# Patient Record
Sex: MALE | Employment: OTHER | URBAN - METROPOLITAN AREA
[De-identification: names, ages, dates, MRNs, and addresses within clinical notes are randomized per-mention and may not be internally consistent; named-entity substitution may affect disease eponyms.]

---

## 2017-05-24 ENCOUNTER — APPOINTMENT (OUTPATIENT)
Dept: CT IMAGING | Age: 82
DRG: 390 | End: 2017-05-24
Attending: EMERGENCY MEDICINE
Payer: MEDICARE

## 2017-05-24 ENCOUNTER — APPOINTMENT (OUTPATIENT)
Dept: GENERAL RADIOLOGY | Age: 82
DRG: 390 | End: 2017-05-24
Attending: EMERGENCY MEDICINE
Payer: MEDICARE

## 2017-05-24 ENCOUNTER — HOSPITAL ENCOUNTER (INPATIENT)
Age: 82
LOS: 2 days | Discharge: HOME | DRG: 390 | End: 2017-05-26
Attending: EMERGENCY MEDICINE | Admitting: SURGERY
Payer: MEDICARE

## 2017-05-24 DIAGNOSIS — R00.1 BRADYCARDIA: ICD-10-CM

## 2017-05-24 DIAGNOSIS — E87.1 HYPONATREMIA: ICD-10-CM

## 2017-05-24 DIAGNOSIS — E86.0 DEHYDRATION: ICD-10-CM

## 2017-05-24 DIAGNOSIS — K56.600 PARTIAL SMALL BOWEL OBSTRUCTION (HCC): Primary | ICD-10-CM

## 2017-05-24 PROBLEM — I10 ESSENTIAL HYPERTENSION: Chronic | Status: ACTIVE | Noted: 2017-05-24

## 2017-05-24 LAB
ALBUMIN SERPL BCP-MCNC: 3.8 G/DL (ref 3.4–5)
ALBUMIN/GLOB SERPL: 0.9 {RATIO} (ref 0.8–1.7)
ALP SERPL-CCNC: 75 U/L (ref 45–117)
ALT SERPL-CCNC: 26 U/L (ref 16–61)
AMPHET UR QL SCN: NEGATIVE
ANION GAP BLD CALC-SCNC: 8 MMOL/L (ref 3–18)
APPEARANCE UR: CLEAR
APTT PPP: 28.1 SEC (ref 23–36.4)
AST SERPL W P-5'-P-CCNC: 19 U/L (ref 15–37)
ATRIAL RATE: 51 BPM
BARBITURATES UR QL SCN: NEGATIVE
BASOPHILS # BLD AUTO: 0 K/UL (ref 0–0.06)
BASOPHILS # BLD AUTO: 0 K/UL (ref 0–0.06)
BASOPHILS # BLD: 0 % (ref 0–2)
BASOPHILS # BLD: 0 % (ref 0–2)
BENZODIAZ UR QL: NEGATIVE
BILIRUB SERPL-MCNC: 0.5 MG/DL (ref 0.2–1)
BILIRUB UR QL: NEGATIVE
BUN SERPL-MCNC: 21 MG/DL (ref 7–18)
BUN/CREAT SERPL: 22 (ref 12–20)
CALCIUM SERPL-MCNC: 9.4 MG/DL (ref 8.5–10.1)
CALCULATED P AXIS, ECG09: 13 DEGREES
CALCULATED R AXIS, ECG10: 36 DEGREES
CALCULATED T AXIS, ECG11: 73 DEGREES
CANNABINOIDS UR QL SCN: NEGATIVE
CHLORIDE SERPL-SCNC: 99 MMOL/L (ref 100–108)
CK MB CFR SERPL CALC: 2 % (ref 0–4)
CK MB SERPL-MCNC: 1.9 NG/ML (ref 5–25)
CK SERPL-CCNC: 96 U/L (ref 39–308)
CO2 SERPL-SCNC: 28 MMOL/L (ref 21–32)
COCAINE UR QL SCN: NEGATIVE
COLOR UR: YELLOW
CREAT SERPL-MCNC: 0.97 MG/DL (ref 0.6–1.3)
DIAGNOSIS, 93000: NORMAL
DIFFERENTIAL METHOD BLD: ABNORMAL
DIFFERENTIAL METHOD BLD: ABNORMAL
EOSINOPHIL # BLD: 0.1 K/UL (ref 0–0.4)
EOSINOPHIL # BLD: 0.1 K/UL (ref 0–0.4)
EOSINOPHIL NFR BLD: 1 % (ref 0–5)
EOSINOPHIL NFR BLD: 1 % (ref 0–5)
ERYTHROCYTE [DISTWIDTH] IN BLOOD BY AUTOMATED COUNT: 13.9 % (ref 11.6–14.5)
ERYTHROCYTE [DISTWIDTH] IN BLOOD BY AUTOMATED COUNT: 13.9 % (ref 11.6–14.5)
GLOBULIN SER CALC-MCNC: 4.2 G/DL (ref 2–4)
GLUCOSE SERPL-MCNC: 165 MG/DL (ref 74–99)
GLUCOSE UR STRIP.AUTO-MCNC: NEGATIVE MG/DL
HCT VFR BLD AUTO: 42.2 % (ref 36–48)
HCT VFR BLD AUTO: 44 % (ref 36–48)
HDSCOM,HDSCOM: NORMAL
HGB BLD-MCNC: 14.7 G/DL (ref 13–16)
HGB BLD-MCNC: 15.3 G/DL (ref 13–16)
HGB UR QL STRIP: NEGATIVE
INR PPP: 1 (ref 0.8–1.2)
KETONES UR QL STRIP.AUTO: NEGATIVE MG/DL
LEUKOCYTE ESTERASE UR QL STRIP.AUTO: NEGATIVE
LIPASE SERPL-CCNC: 106 U/L (ref 73–393)
LYMPHOCYTES # BLD AUTO: 21 % (ref 21–52)
LYMPHOCYTES # BLD AUTO: 8 % (ref 21–52)
LYMPHOCYTES # BLD: 1.4 K/UL (ref 0.9–3.6)
LYMPHOCYTES # BLD: 3.1 K/UL (ref 0.9–3.6)
MAGNESIUM SERPL-MCNC: 2 MG/DL (ref 1.6–2.6)
MCH RBC QN AUTO: 30.9 PG (ref 24–34)
MCH RBC QN AUTO: 31 PG (ref 24–34)
MCHC RBC AUTO-ENTMCNC: 34.8 G/DL (ref 31–37)
MCHC RBC AUTO-ENTMCNC: 34.8 G/DL (ref 31–37)
MCV RBC AUTO: 88.8 FL (ref 74–97)
MCV RBC AUTO: 89.2 FL (ref 74–97)
METHADONE UR QL: NEGATIVE
MONOCYTES # BLD: 0.7 K/UL (ref 0.05–1.2)
MONOCYTES # BLD: 1.6 K/UL (ref 0.05–1.2)
MONOCYTES NFR BLD AUTO: 5 % (ref 3–10)
MONOCYTES NFR BLD AUTO: 9 % (ref 3–10)
NEUTS SEG # BLD: 10.5 K/UL (ref 1.8–8)
NEUTS SEG # BLD: 14.7 K/UL (ref 1.8–8)
NEUTS SEG NFR BLD AUTO: 73 % (ref 40–73)
NEUTS SEG NFR BLD AUTO: 82 % (ref 40–73)
NITRITE UR QL STRIP.AUTO: NEGATIVE
OPIATES UR QL: NEGATIVE
P-R INTERVAL, ECG05: 186 MS
PCP UR QL: NEGATIVE
PH UR STRIP: 6.5 [PH] (ref 5–8)
PLATELET # BLD AUTO: 184 K/UL (ref 135–420)
PLATELET # BLD AUTO: 216 K/UL (ref 135–420)
PMV BLD AUTO: 8.8 FL (ref 9.2–11.8)
PMV BLD AUTO: 9.2 FL (ref 9.2–11.8)
POTASSIUM SERPL-SCNC: 4 MMOL/L (ref 3.5–5.5)
PROT SERPL-MCNC: 8 G/DL (ref 6.4–8.2)
PROT UR STRIP-MCNC: NEGATIVE MG/DL
PROTHROMBIN TIME: 13.3 SEC (ref 11.5–15.2)
Q-T INTERVAL, ECG07: 476 MS
QRS DURATION, ECG06: 108 MS
QTC CALCULATION (BEZET), ECG08: 438 MS
RBC # BLD AUTO: 4.75 M/UL (ref 4.7–5.5)
RBC # BLD AUTO: 4.93 M/UL (ref 4.7–5.5)
SODIUM SERPL-SCNC: 135 MMOL/L (ref 136–145)
SP GR UR REFRACTOMETRY: >1.03 (ref 1–1.03)
TROPONIN I SERPL-MCNC: <0.02 NG/ML (ref 0–0.06)
UROBILINOGEN UR QL STRIP.AUTO: 1 EU/DL (ref 0.2–1)
VENTRICULAR RATE, ECG03: 51 BPM
WBC # BLD AUTO: 14.5 K/UL (ref 4.6–13.2)
WBC # BLD AUTO: 17.8 K/UL (ref 4.6–13.2)

## 2017-05-24 PROCEDURE — 96361 HYDRATE IV INFUSION ADD-ON: CPT

## 2017-05-24 PROCEDURE — 96374 THER/PROPH/DIAG INJ IV PUSH: CPT

## 2017-05-24 PROCEDURE — 99285 EMERGENCY DEPT VISIT HI MDM: CPT

## 2017-05-24 PROCEDURE — 74011250636 HC RX REV CODE- 250/636: Performed by: SURGERY

## 2017-05-24 PROCEDURE — 74011250636 HC RX REV CODE- 250/636: Performed by: EMERGENCY MEDICINE

## 2017-05-24 PROCEDURE — 74011636320 HC RX REV CODE- 636/320: Performed by: EMERGENCY MEDICINE

## 2017-05-24 PROCEDURE — 82550 ASSAY OF CK (CPK): CPT | Performed by: EMERGENCY MEDICINE

## 2017-05-24 PROCEDURE — 80053 COMPREHEN METABOLIC PANEL: CPT | Performed by: EMERGENCY MEDICINE

## 2017-05-24 PROCEDURE — 71010 XR CHEST PORT: CPT

## 2017-05-24 PROCEDURE — 83690 ASSAY OF LIPASE: CPT | Performed by: EMERGENCY MEDICINE

## 2017-05-24 PROCEDURE — 74011250636 HC RX REV CODE- 250/636: Performed by: FAMILY MEDICINE

## 2017-05-24 PROCEDURE — 81003 URINALYSIS AUTO W/O SCOPE: CPT | Performed by: EMERGENCY MEDICINE

## 2017-05-24 PROCEDURE — 85730 THROMBOPLASTIN TIME PARTIAL: CPT | Performed by: EMERGENCY MEDICINE

## 2017-05-24 PROCEDURE — 85025 COMPLETE CBC W/AUTO DIFF WBC: CPT | Performed by: EMERGENCY MEDICINE

## 2017-05-24 PROCEDURE — 85610 PROTHROMBIN TIME: CPT | Performed by: EMERGENCY MEDICINE

## 2017-05-24 PROCEDURE — 65270000029 HC RM PRIVATE

## 2017-05-24 PROCEDURE — 96375 TX/PRO/DX INJ NEW DRUG ADDON: CPT

## 2017-05-24 PROCEDURE — 97161 PT EVAL LOW COMPLEX 20 MIN: CPT

## 2017-05-24 PROCEDURE — 93005 ELECTROCARDIOGRAM TRACING: CPT

## 2017-05-24 PROCEDURE — 74177 CT ABD & PELVIS W/CONTRAST: CPT

## 2017-05-24 PROCEDURE — 36415 COLL VENOUS BLD VENIPUNCTURE: CPT | Performed by: FAMILY MEDICINE

## 2017-05-24 PROCEDURE — 80307 DRUG TEST PRSMV CHEM ANLYZR: CPT | Performed by: FAMILY MEDICINE

## 2017-05-24 PROCEDURE — 83735 ASSAY OF MAGNESIUM: CPT | Performed by: EMERGENCY MEDICINE

## 2017-05-24 RX ORDER — SODIUM CHLORIDE 9 MG/ML
130 INJECTION, SOLUTION INTRAVENOUS CONTINUOUS
Status: DISCONTINUED | OUTPATIENT
Start: 2017-05-24 | End: 2017-05-25

## 2017-05-24 RX ORDER — ONDANSETRON 2 MG/ML
4 INJECTION INTRAMUSCULAR; INTRAVENOUS
Status: COMPLETED | OUTPATIENT
Start: 2017-05-24 | End: 2017-05-24

## 2017-05-24 RX ORDER — CARVEDILOL 3.12 MG/1
3.12 TABLET ORAL 2 TIMES DAILY WITH MEALS
COMMUNITY

## 2017-05-24 RX ORDER — DEXTROSE, SODIUM CHLORIDE, AND POTASSIUM CHLORIDE 5; .45; .15 G/100ML; G/100ML; G/100ML
125 INJECTION INTRAVENOUS CONTINUOUS
Status: DISCONTINUED | OUTPATIENT
Start: 2017-05-24 | End: 2017-05-26

## 2017-05-24 RX ORDER — ONDANSETRON 2 MG/ML
INJECTION INTRAMUSCULAR; INTRAVENOUS
Status: DISPENSED
Start: 2017-05-24 | End: 2017-05-24

## 2017-05-24 RX ORDER — HYDROMORPHONE HYDROCHLORIDE 1 MG/ML
1 INJECTION, SOLUTION INTRAMUSCULAR; INTRAVENOUS; SUBCUTANEOUS
Status: DISCONTINUED | OUTPATIENT
Start: 2017-05-24 | End: 2017-05-26 | Stop reason: HOSPADM

## 2017-05-24 RX ORDER — LATANOPROST 50 UG/ML
1 SOLUTION/ DROPS OPHTHALMIC
Status: DISCONTINUED | OUTPATIENT
Start: 2017-05-24 | End: 2017-05-26 | Stop reason: HOSPADM

## 2017-05-24 RX ORDER — FUROSEMIDE 40 MG/1
40 TABLET ORAL DAILY
Status: DISCONTINUED | OUTPATIENT
Start: 2017-05-25 | End: 2017-05-25

## 2017-05-24 RX ORDER — SODIUM CHLORIDE 0.9 % (FLUSH) 0.9 %
5-10 SYRINGE (ML) INJECTION AS NEEDED
Status: DISCONTINUED | OUTPATIENT
Start: 2017-05-24 | End: 2017-05-26 | Stop reason: HOSPADM

## 2017-05-24 RX ORDER — LOSARTAN POTASSIUM 25 MG/1
25 TABLET ORAL DAILY
COMMUNITY

## 2017-05-24 RX ORDER — SODIUM CHLORIDE 0.9 % (FLUSH) 0.9 %
5-10 SYRINGE (ML) INJECTION EVERY 8 HOURS
Status: DISCONTINUED | OUTPATIENT
Start: 2017-05-24 | End: 2017-05-26 | Stop reason: HOSPADM

## 2017-05-24 RX ORDER — ONDANSETRON 2 MG/ML
4 INJECTION INTRAMUSCULAR; INTRAVENOUS
Status: DISCONTINUED | OUTPATIENT
Start: 2017-05-24 | End: 2017-05-26 | Stop reason: HOSPADM

## 2017-05-24 RX ORDER — KETOROLAC TROMETHAMINE 15 MG/ML
15 INJECTION, SOLUTION INTRAMUSCULAR; INTRAVENOUS
Status: DISPENSED | OUTPATIENT
Start: 2017-05-24 | End: 2017-05-25

## 2017-05-24 RX ORDER — ACETAMINOPHEN 10 MG/ML
INJECTION, SOLUTION INTRAVENOUS
Status: DISPENSED
Start: 2017-05-24 | End: 2017-05-24

## 2017-05-24 RX ORDER — ACETAMINOPHEN 10 MG/ML
1000 INJECTION, SOLUTION INTRAVENOUS EVERY 6 HOURS
Status: COMPLETED | OUTPATIENT
Start: 2017-05-24 | End: 2017-05-24

## 2017-05-24 RX ORDER — MORPHINE SULFATE 2 MG/ML
2 INJECTION, SOLUTION INTRAMUSCULAR; INTRAVENOUS ONCE
Status: COMPLETED | OUTPATIENT
Start: 2017-05-24 | End: 2017-05-24

## 2017-05-24 RX ADMIN — HYDROMORPHONE HYDROCHLORIDE 1 MG: 1 INJECTION, SOLUTION INTRAMUSCULAR; INTRAVENOUS; SUBCUTANEOUS at 13:45

## 2017-05-24 RX ADMIN — ONDANSETRON HYDROCHLORIDE 4 MG: 2 INJECTION INTRAMUSCULAR; INTRAVENOUS at 05:30

## 2017-05-24 RX ADMIN — ONDANSETRON 4 MG: 2 INJECTION INTRAMUSCULAR; INTRAVENOUS at 11:25

## 2017-05-24 RX ADMIN — DEXTROSE MONOHYDRATE, SODIUM CHLORIDE, AND POTASSIUM CHLORIDE 125 ML/HR: 50; 4.5; 1.49 INJECTION, SOLUTION INTRAVENOUS at 16:35

## 2017-05-24 RX ADMIN — IOPAMIDOL 100 ML: 612 INJECTION, SOLUTION INTRAVENOUS at 06:03

## 2017-05-24 RX ADMIN — KETOROLAC TROMETHAMINE 15 MG: 15 INJECTION, SOLUTION INTRAMUSCULAR; INTRAVENOUS at 18:56

## 2017-05-24 RX ADMIN — ACETAMINOPHEN 1000 MG: 10 INJECTION, SOLUTION INTRAVENOUS at 05:00

## 2017-05-24 RX ADMIN — Medication 2 MG: at 11:25

## 2017-05-24 RX ADMIN — SODIUM CHLORIDE 130 ML/HR: 900 INJECTION, SOLUTION INTRAVENOUS at 10:22

## 2017-05-24 RX ADMIN — SODIUM CHLORIDE 1000 ML: 900 INJECTION, SOLUTION INTRAVENOUS at 06:16

## 2017-05-24 NOTE — ED NOTES
Pt hourly rounding competed. Wife at bedside with him. Safety   Pt (x) resting on stretcher with side rails up and call bell in reach. () in chair    () in parents arms. Toileting   Pt offered ()Bedpan     (x)Assistance to Restroom     ()Urinal  Ongoing Updates  Updated on plan of care and status of test results.   Pain Management  Inquired as to comfort and offered comfort measures:    (x) warm blankets   (x) dimmed lights

## 2017-05-24 NOTE — ROUTINE PROCESS
Bedside and Verbal shift change report given to Davide Norwood RN (oncoming nurse) by Montserrat Patel RN (offgoing nurse). Report included the following information SBAR, Kardex, Intake/Output, MAR and Recent Results.

## 2017-05-24 NOTE — ED NOTES
Patient transported at this time via stretcher to medical floor patient is alert and oriented at time of transfer.  All personal belonging included glasses sent with pateint transported with isidro Forrester

## 2017-05-24 NOTE — Clinical Note
Status[de-identified] Inpatient [101] Type of Bed: Medical [8] Inpatient Hospitalization Certified Necessary for the Following Reasons: 3. Patient receiving treatment that can only be provided in an inpatient setting (further clarification in H&P documentation) Admitting Diagnosis: Partial small bowel obstruction ([facility] [address] [[id number]] Admitting Physician: Phill Lee Attending Physician: Phill Lee Estimated Length of Stay: 3-4 Midnights Discharge Plan[de-identified] Home with Office Follow-up

## 2017-05-24 NOTE — IP AVS SNAPSHOT
[address] [address] 
[phone] Patient: Elijah Shankar MRN: [id number] FFT:9/44/1577 You are allergic to the following Allergen Reactions Venom-Wasp Anaphylaxis Pcn (Penicillins) Rash Recent Documentation Height Weight BMI Smoking Status 1.778 m 94.7 kg 29.96 kg/m2 Former Smoker Unresulted Labs Order Current Status CARDIAC PANEL,(CK, CKMB & TROPONIN) Collected (05/24/17 0442) LIPASE Collected (05/24/17 0442) MAGNESIUM Collected (05/24/17 0442) METABOLIC PANEL, COMPREHENSIVE Collected (05/24/17 0442) PROTHROMBIN TIME + INR Collected (05/24/17 0442) PTT Collected (05/24/17 0442) Emergency Contacts Name Discharge Info Relation Home Work Mobile Nicolette Felix  Spouse [3] [phone] About your hospitalization You were admitted on:  May 24, 2017 You last received care in the:  [address] [place] [address] You were discharged on:  May 26, 2017 Unit phone number:  [phone] Why you were hospitalized Your primary diagnosis was:  Partial Small Bowel Obstruction (Hcc) Your diagnoses also included:  Dehydration, Bradycardia, Hyponatremia, Essential Hypertension, Infectious Colitis, Enteritis And Gastroenteritis Providers Seen During Your Hospitalizations Provider Role Specialty Primary office phone Rachell Atkinson MD Attending Provider Emergency Medicine [phone] Krupa Talamantes MD Attending Provider General Surgery [phone] Your Primary Care Physician (PCP) Primary Care Physician Office Phone Office Fax Bess Coronado [phone] Follow-up Information Follow up With Details Comments Contact Info Tan Estevez MD On 5/30/2017 Follow-up appointment @ 1:45 p.m. [address] 
[phone] Current Discharge Medication List  
  
CONTINUE these medications which have NOT CHANGED Dose & Instructions Dispensing Information Comments Morning Noon Evening Bedtime COREG 3.125 mg tablet Generic drug:  carvedilol Your last dose was: Your next dose is:    
   
   
 Dose:  3.125 mg Take 3.125 mg by mouth two (2) times daily (with meals). Refills:  0  
     
   
   
   
  
 latanoprost 0.005 % ophthalmic solution Commonly known as:  Tonye Rist Your last dose was: Your next dose is:    
   
   
 Dose:  1 Drop Administer 1 Drop to both eyes nightly. Refills:  0  
     
   
   
   
  
 losartan 25 mg tablet Commonly known as:  COZAAR Your last dose was: Your next dose is:    
   
   
 Dose:  25 mg Take 25 mg by mouth daily. Refills:  0  
     
   
   
   
  
 magnesium hydroxide 400 mg/5 mL suspension Commonly known as:  MILK OF MAGNESIA Your last dose was: Your next dose is:    
   
   
 Dose:  30 mL Take 30 mL by mouth daily. Quantity:  180 mL Refills:  0  
     
   
   
   
  
 potassium chloride 20 mEq/15 mL solution Commonly known as:  KAON 10% Your last dose was: Your next dose is:    
   
   
 Dose:  20 mEq Take 15 mL by mouth two (2) times daily (with meals). Quantity:  480 mL Refills:  0 Discharge Instructions Bowel Blockage (Intestinal Obstruction): Care Instructions Your Care Instructions A bowel blockage, also called an intestinal obstruction, can prevent gas, fluids, or solids from moving through the intestines normally. It can cause constipation and, rarely, diarrhea. You may have pain, nausea, vomiting, and cramping. Most of the time, complete blockages require a stay in the hospital and possibly surgery.  But if your bowel is only partly blocked, your doctor may tell you to wait until it clears on its own and you are able to pass gas and stool. If so, there are things you can do at home to help make you feel better. If you have had surgery for a bowel blockage, there are things you can do at home to make sure you heal well. You can also make some changes to keep your bowel from becoming blocked again. Follow-up care is a key part of your treatment and safety. Be sure to make and go to all appointments, and call your doctor if you are having problems. It's also a good idea to know your test results and keep a list of the medicines you take. How can you care for yourself at home? If your doctor has told you to wait at home for a blockage to clear on its own: · Follow your doctor's instructions. These may include eating a liquid diet to avoid complete blockage. · Take your medicines exactly as prescribed. Call your doctor if you think you are having a problem with your medicine. · Put a heating pad set on low on your belly to relieve mild cramps and pain. To prevent another blockage · Try to eat smaller amounts of food more often. For example, have 5 or 6 small meals throughout the day instead of 2 or 3 large meals. · Chew your food very well. Try to chew each bite about 20 times or until it is liquid. · Avoid high-fiber foods and raw fruits and vegetables with skins, husks, strings, or seeds. These can form a ball of undigested material that can cause a blockage if a part of your bowel is scarred or narrowed. · Check with your doctor before you eat whole-grain products or use a fiber supplement such as Citrucel or Metamucil. · To help you have regular bowel movements, eat at regular times, do not strain during a bowel movement, and drink at least 8 to 10 glasses of water each day. If you have kidney, heart, or liver disease and have to limit fluids, talk with your doctor or before you increase the amount of fluids you drink. · Drink high-calorie liquid formulas if your doctor says to.  Severe symptoms may make it hard for your body to take in vitamins and minerals. · Get regular exercise. It helps you digest your food better. Get at least 30 minutes of physical activity on most days of the week. Walking is a good choice. When should you call for help? Call 911 anytime you think you may need emergency care. For example, call if: 
· You passed out (lost consciousness). · You have severe pain or swelling in your belly. · You vomit blood or what looks like coffee grounds. · You pass maroon or very bloody stools. · You cannot pass any stools or gas. Call your doctor now or seek immediate medical care if: 
· You have a new or higher fever. · You cannot keep fluids or medicines down. · You have new pain that gets worse when you move or cough. · Your symptoms become much worse than usual. 
Watch closely for changes in your health, and be sure to contact your doctor if: 
· You do not get better as expected. · You have steady diarrhea for more than 2 weeks. · You've been losing weight. Where can you learn more? Go to http://tequila-wong.info/. Enter J836 in the search box to learn more about \"Bowel Blockage (Intestinal Obstruction): Care Instructions. \" Current as of: August 9, 2016 Content Version: 11.2 © 9961-0964 [employer]. Care instructions adapted under license by [employer] (which disclaims liability or warranty for this information). If you have questions about a medical condition or this instruction, always ask your healthcare professional. [place] [address] any warranty or liability for your use of this information. Discharge Orders None Introducing hospitals & HEALTH SERVICES! Lizbeth Cisneros introduces [employer] patient portal. Now you can access parts of your medical record, email your doctor's office, and request medication refills online. 1. In your internet browser, go to [email or web address] 2. Click on the First Time User? Click Here link in the Sign In box. You will see the New Member Sign Up page. 3. Enter your [employer] Access Code exactly as it appears below. You will not need to use this code after youve completed the sign-up process. If you do not sign up before the expiration date, you must request a new code. · [employer] Access Code: [email or web address] Expires: 8/22/2017 12:51 PM 
 
4. Enter the last four digits of your Social Security Number (xxxx) and Date of Birth (mm/dd/yyyy) as indicated and click Submit. You will be taken to the next sign-up page. 5. Create a [employer] ID. This will be your [employer] login ID and cannot be changed, so think of one that is secure and easy to remember. 6. Create a [employer] password. You can change your password at any time. 7. Enter your Password Reset Question and Answer. This can be used at a later time if you forget your password. 8. Enter your e-mail address. You will receive e-mail notification when new information is available in [address] 9. Click Sign Up. You can now view and download portions of your medical record. 10. Click the Download Summary menu link to download a portable copy of your medical information. If you have questions, please visit the Frequently Asked Questions section of the [employer] website. Remember, [employer] is NOT to be used for urgent needs. For medical emergencies, dial 911. Now available from your iPhone and Android! General Information Please provide this summary of care documentation to your next provider. Patient Signature:  ____________________________________________________________ Date:  ____________________________________________________________  
  
[facility] Provider Signature:  ____________________________________________________________ Date:  ____________________________________________________________

## 2017-05-24 NOTE — ED NOTES
Bedside shift change report given to ANGEL Wade RN (oncoming nurse) by Avelino Holts. Gilford Ort RN (offgoing nurse). Report included the following information SBAR, Kardex, ED Summary, Procedure Summary, MAR, Accordion, Recent Results, Med Rec Status and Cardiac Rhythm NSR.

## 2017-05-24 NOTE — PROGRESS NOTES
Problem: Mobility Impaired (Adult and Pediatric)  Goal: *Acute Goals and Plan of Care (Insert Text)  Physical Therapy Goals  Initiated 5/24/2017 and to be accomplished within 3-5 day(s)  1. Patient will move from supine <> sit with S in prep for out of bed activity and change of position. 2. Patient will perform sit<> stand with S with LRAD in prep for transfers/ambulation. 3. Patient will transfer from bed <> chair with S with LRAD for time up in chair for completion of ADL activity. 4. Patient will ambulate 150 feet with LRAD for increased functional mobility at discharge. 5. Patient will ascend/descend 3-5 stairs with handrail(s) with minimal assistance/contact guard assist for home re-entry as needed. Outcome: Progressing Towards Goal  PHYSICAL THERAPY EVALUATION     Patient: Amanuel Garg (49 y.o. male)  Date: 5/24/2017  Primary Diagnosis: Partial small bowel obstruction (HCC)  SBO (small bowel obstruction) (HCC)  Precautions:Fall      ASSESSMENT :  Based on the objective data described below, the patient is an 81 yo M who presents with decreased independence in functional mobility with regard to bed mobility, transfers, gait, balance and activity tolerance with admission for above diagnosis. Pt nurse Johnson present and administering IV pain medication upon PT entry. Pt with abdominal pain 6/10 pre and 5/10 post session. Demonstrates bed mobility with SBA/Supervision and transfers with CGA. Standing balance fair with RW static/dynamic. Pt able pt participate in gt with RW/minimal assist, demonstrating slow marco a with shuffling gt and forward trunk posture/knee flexed. Small base of support also noted. Pt returned to bed and left supine in bed with all needs in reach. O2 in place throughout session. Pt desat to 87/88% with gt; returns to 93% with rest seated. Nurse Johnson notified.    Recommend HHPT for follow up physical therapy upon discharge to reach maximal level of independence/safety with functional mobility. Pt Education: pt educated in safety/technique during functional mobility tasks      Patient will benefit from skilled intervention to address the above impairments. Patients rehabilitation potential is considered to be Good  Factors which may influence rehabilitation potential include:   [ ]         None noted  [ ]         Mental ability/status  [X]         Medical condition  [ ]         Home/family situation and support systems  [ ]         Safety awareness  [ ]         Pain tolerance/management  [ ]         Other:        PLAN :  Recommendations and Planned Interventions:  [X]           Bed Mobility Training             [ ]    Neuromuscular Re-Education  [X]           Transfer Training                   [ ]    Orthotic/Prosthetic Training  [X]           Gait Training                          [ ]    Modalities  [X]           Therapeutic Exercises          [ ]    Edema Management/Control  [X]           Therapeutic Activities            [X]    Patient and Family Training/Education  [ ]           Other (comment):     Frequency/Duration: Patient will be followed by physical therapy 1-2 times per day to address goals. Discharge Recommendations: Home Health  Further Equipment Recommendations for Discharge: N/A       SUBJECTIVE:   Patient stated I usually and get around.       OBJECTIVE DATA SUMMARY:       Past Medical History:   Diagnosis Date    Arthritis      Hypertension       20yrs     Past Surgical History:   Procedure Laterality Date    HX BACK SURGERY         laminectomy    HX CATARACT REMOVAL        HX ORTHOPAEDIC   17y/o     broken right arm     HX ORTHOPAEDIC   1995     broke right ankle screws    HX TONSILLECTOMY         Barriers to Learning/Limitations: None  Compensate with: N/A  Prior Level of Function/Home Situation: amb with SPC or RW with standing for long period per pt.   Home Situation  Home Environment: Private residence  # Steps to Enter: 4  Rails to Enter: Yes  Hand Rails : Bilateral  One/Two Story Residence: Two story  # of Interior Steps: 12 (bedroom downstairs)  Interior Rails: Both  Living Alone: No  Support Systems: Spouse/Significant Other/Partner  Patient Expects to be Discharged to[employer] residence  Current DME Used/Available at Home: Silverio Jennifer, straight, Walker, rolling, Safety frame [employer], Shower chair, Wheelchair, Brace/Splint  Tub or Shower Type: Shower  Critical Behavior:  Neurologic State: Alert; Appropriate for age  Orientation Level: Appropriate for age;Oriented X4  Cognition: Appropriate decision making; Appropriate for age attention/concentration; Follows commands  Safety/Judgement: Awareness of environment; Fall prevention  Psychosocial  Patient Behaviors: Calm; Cooperative  Skin Condition/Temp: Warm  Skin Integumentary  Skin Color: Pale  Skin Condition/Temp: Warm  Turgor: Epidermis thin w/ loss of subcut tissue  Hair Growth: Sparce  Strength:    Strength: Generally decreased, functional (LE's; UE's grossly WFL)  Tone & Sensation:   Tone: Normal  Sensation: Intact  Range Of Motion:  AROM: Generally decreased, functional (R shld decr'd w/long h/o of same; bilat HS tightness )  PROM: Generally decreased, functional (as above)  Functional Mobility:  Bed Mobility:  Rolling: Supervision  Supine to Sit: Stand-by asssistance  Sit to Supine: Stand-by asssistance  Scooting: Supervision  Transfers:  Sit to Stand: Contact guard assistance  Stand to Sit: Contact guard assistance  Balance:   Sitting: Intact  Standing: Impaired; With support  Standing - Static: Fair  Standing - Dynamic : Fair  Ambulation/Gait Training:  Distance (ft): 30 Feet (ft)  Assistive Device: Gait belt;Walker, rolling (O2 in place)  Ambulation - Level of Assistance: Minimal assistance  Gait Abnormalities: Decreased step clearance;Shuffling gait;Trunk sway increased  Base of Support: Narrowed  Speed/Tara: Slow  Step Length: Left shortened;Right shortened  Swing Pattern: Left asymmetrical;Right asymmetrical  Interventions: Safety awareness training;Verbal cues  Pain:  Pain Scale 1: Numeric (0 - 10)  Pain Intensity 1: 5  Pain Location 1: Abdomen  Pain Orientation 1: Anterior  Pain Description 1: Aching;Constant  Pain Intervention(s) 1: Medication (see MAR)  Activity Tolerance:   Fair   Please refer to the flowsheet for vital signs taken during this treatment. After treatment:   [ ]         Patient left in no apparent distress sitting up in chair  [X]         Patient left in no apparent distress in bed  [X]         Call bell left within reach  [ ]         Nursing notified  [ ]         Caregiver present  [ ]         Bed alarm activated      COMMUNICATION/EDUCATION:   [X]         Fall prevention education was provided and the patient/caregiver indicated understanding. [X]         Patient/family have participated as able in goal setting and plan of care. [X]         Patient/family agree to work toward stated goals and plan of care. [ ]         Patient understands intent and goals of therapy, but is neutral about his/her participation. [ ]         Patient is unable to participate in goal setting and plan of care. Eval Complexity: History: MEDIUM  Complexity : 1-2 comorbidities / personal factors will impact the outcome/ POC Exam:LOW Complexity : 1-2 Standardized tests and measures addressing body structure, function, activity limitation and / or participation in recreation  Presentation: LOW Complexity : Stable, uncomplicated  Clinical Decision Making:Low Complexity amb 30ft  Overall Complexity:LOW      G-Codes (GP)  Mobility  [id number] Current  CJ= 20-39%   Goal  CI= 1-19%  The severity rating is based on the functional mobility assessment.      Thank you for this referral.  Refugio Turpin, PT   Time Calculation: 20 mins

## 2017-05-24 NOTE — ED NOTES
Dr. Estrada Coats in to see patient. Asked him if he wanted to try ng placement he said \"no\". Rn waited for pain medications to work and attempted again for ng placement but unable to advance on right and left comes out of his mouth. Telephone call to [place] on update of patient.

## 2017-05-24 NOTE — ROUTINE PROCESS
TRANSFER - IN REPORT:    Verbal report received from ANGEL Montez RN (name) on Ivett Bender  being received from ED (unit) for routine progression of care      Report consisted of patients Situation, Background, Assessment and   Recommendations(SBAR). Information from the following report(s) SBAR, Kardex, Intake/Output, MAR and Recent Results was reviewed with the receiving nurse. Opportunity for questions and clarification was provided. Assessment completed upon patients arrival to unit and care assumed.

## 2017-05-24 NOTE — IP AVS SNAPSHOT
Current Discharge Medication List  
  
CONTINUE these medications which have NOT CHANGED Dose & Instructions Dispensing Information Comments Morning Noon Evening Bedtime COREG 3.125 mg tablet Generic drug:  carvedilol Your last dose was: Your next dose is:    
   
   
 Dose:  3.125 mg Take 3.125 mg by mouth two (2) times daily (with meals). Refills:  0  
     
   
   
   
  
 latanoprost 0.005 % ophthalmic solution Commonly known as:  Biggs Ridgel Your last dose was: Your next dose is:    
   
   
 Dose:  1 Drop Administer 1 Drop to both eyes nightly. Refills:  0  
     
   
   
   
  
 losartan 25 mg tablet Commonly known as:  COZAAR Your last dose was: Your next dose is:    
   
   
 Dose:  25 mg Take 25 mg by mouth daily. Refills:  0  
     
   
   
   
  
 magnesium hydroxide 400 mg/5 mL suspension Commonly known as:  MILK OF MAGNESIA Your last dose was: Your next dose is:    
   
   
 Dose:  30 mL Take 30 mL by mouth daily. Quantity:  180 mL Refills:  0  
     
   
   
   
  
 potassium chloride 20 mEq/15 mL solution Commonly known as:  KAON 10% Your last dose was: Your next dose is:    
   
   
 Dose:  20 mEq Take 15 mL by mouth two (2) times daily (with meals). Quantity:  480 mL Refills:  0

## 2017-05-24 NOTE — PROGRESS NOTES
It appears home medications have yet to be reviewed with this patient. Dr. Yariel Lewis ordered the following meds to be continued upon admission which need clarification. Left message for RN on 3S to call back regarding reviewing/clarifiying home meds with patient. 1.  Lasix 40mg daily (entered onto [facility] Med list back in 2014 for a qty of #3 and x 3 days only)   2. Zestoretic 20/12.5mg sig: take half tab daily.       [address] [place] [address] Pharmacist  [phone]

## 2017-05-24 NOTE — PROGRESS NOTES
Spoke with RHONA Johnson who states when she attempted to review with the pt he denied lasix and prinzide and reports coreg and another med that he doesn't know dose of.  RN agrees to call pt wife at home to obtain current med list and will contact prescriber to d/c and/or add meds as appropriate.     [facility] Pharmacist  [phone]

## 2017-05-24 NOTE — ED NOTES
Telephone call with Dr. Beth Houston unable x2 nurses to advance ng. Patient requested to stop for now. Dr. Beth Houston said he underdstands.

## 2017-05-24 NOTE — CONSULTS
Medicine Consult Note    Patient: Enid Acevedo   Age:  80 y.o. Consulting Physician: Mikaela Liang MD  Reason for Consult: Medical Management; patient admitted by General Surgery with SBO    HPI:   Enid Acevedo is a 80 y.o. male who is being admitted by General Surgery with an SBO. We have been consulted for assistance with medical management. He has a PMH of arthritis, hypertension, glaucoma, mild aortic stenosis--following with Dr. Margoth Cristina, and a remote history of TB-treated appropriately. His chart does show sleep apnea but he does not use O2 or a CPAP machine at night. He reports sudden onset of generalized abdominal pain starting at 7pm last night. He had last eaten at 5pm and reports that this was a \"significant amount\" of shrimp. He had also eaten peanuts around 3pm.  He has been mildly nauseated along with this pain. He has not vomited, however. He reports no recent medical changes and no recent illnesses. He describes himself as \"always gassy\" but has not passed flatus since before dinner yesterday. Last BM was 2 days ago. He has no other complaints.     Past Medical History:  Past Medical History:   Diagnosis Date    Arthritis     Hypertension     20yrs       Past Surgical History:  Past Surgical History:   Procedure Laterality Date    HX BACK SURGERY      laminectomy    HX CATARACT REMOVAL      HX ORTHOPAEDIC  15y/o    broken right arm     HX ORTHOPAEDIC  1995    broke right ankle screws    HX TONSILLECTOMY         Family History:  Family History   Problem Relation Age of Onset    Cancer Mother      Colon       Social History:  Social History     Social History    Marital status:      Spouse name: N/A    Number of children: N/A    Years of education: N/A     Social History Main Topics    Smoking status: Former Smoker    Smokeless tobacco: None    Alcohol use 1.0 oz/week     2 Cans of beer per week      Comment: daily    Drug use: No    Sexual activity: Not Asked Other Topics Concern    None     Social History Narrative       Home Medications:  Prior to Admission medications    Medication Sig Start Date End Date Taking? Authorizing Provider   magnesium hydroxide (MILK OF MAGNESIA) 400 mg/5 mL suspension Take 30 mL by mouth daily. 1/14/14   Adiel Zepeda MD   potassium chloride (KAON 10%) 10 % solution Take 15 mL by mouth two (2) times daily (with meals). 1/14/14   Adiel Zepeda MD   QUEtiapine (SEROQUEL) 25 mg tablet Take 1 Tab by mouth nightly. 1/14/14   Adiel Zepeda MD   furosemide (LASIX) 40 mg tablet Take 1 Tab by mouth daily. X 3 days 1/3/14   Krystyna Walters MD   oxyCODONE-acetaminophen (PERCOCET) 5-325 mg per tablet Take 1-2 Tabs by mouth every four (4) hours as needed for Pain. 12/31/13   Adiel Zeepda MD   latanoprost (XALATAN) 0.005 % ophthalmic solution Administer 1 Drop to both eyes nightly. Historical Provider   lisinopril-hydrochlorothiazide (PRINZIDE, ZESTORETIC) 20-12.5 mg per tablet Take 0.5 Tabs by mouth daily. Historical Provider       Allergies: Allergies   Allergen Reactions    Venom-Wasp Anaphylaxis    Pcn [Penicillins] Rash       Review of Systems  A comprehensive review of systems was negative except for that written in the History of Present Illness. Physical Exam:     Visit Vitals    /76    Pulse 72    Temp 97.6 °F (36.4 °C)    Resp 18    Ht 5' 10\" (1.778 m)    Wt 90.7 kg (200 lb)    SpO2 91%    BMI 28.7 kg/m2       Physical Exam:  General appearance: alert, cooperative, no distress, appears stated age, non-toxic  Head: Normocephalic, without obvious abnormality, atraumatic, dry MM  Neck: supple, trachea midline  Lungs: clear to auscultation bilaterally  Heart: bradycardic regular rhythm, S1, S2 normal, soft grade 1 systolic murmur RUSB, no click, rub or gallop  Abdomen: soft, generalized mild tenderness. Bowel sounds hypoactive.  No masses,  no organomegaly  Extremities: extremities normal, atraumatic, no cyanosis or edema  Skin: Skin color, texture, turgor normal. No rashes or lesions  Neurologic: Grossly normal    Intake and Output:  Current Shift:     Last three shifts:       Lab/Data Reviewed: All lab results for the last 24 hours reviewed. Medications Reviewed. Assessment/Plan       Partial small bowel obstruction ([address] (5/24/2017)  - primary management per surgical team  - NPO, NG to LIWS  - NS @ 130cc/hr following 1L NS for gentle rehydration  - coags normal      Dehydration (5/24/2017)  - fluids as above  - not taking home lasix--PTA med list is incorrect  - I would not start any diuretics at this time      Bradycardia (5/24/2017)  - mild, likely his normal HR given home treatment w/ coreg  - not immediately concerning, monitor      Hyponatremia (5/24/2017)  - hypochloremic + BUN/Cr ratio increased suggests due to dehydration  - follow daily      Essential hypertension (5/24/2017)  - hold home antihypertensives for now  - would restart losartan before coreg given bradycardia    Dispo: per surgery    Thank you for allowing us to participate in the care of your patient. We will be following along with you.     Mare Ceballos Null, DO

## 2017-05-24 NOTE — ED PROVIDER NOTES
HPI Comments: 4:35 AM  Erick Gupta is a 80 y.o. male presenting to the ED C/O gradually worsening lower abdominal pain (8/10) onset 9 hours ago. Pt notes that he  ate a significant amount of shrimp last night. Pt notes that he also experienced  mild nausea upon onset of pain. Pt also complains of acute exacerbation of his back pain. Last BM was yesterday which he reports as normal. Dr. Grzegorz Guardado is his cardiologist. Pt does not use O2 at home. Pt is allergic to PCN and bees. PSHx spinal fusion and orthopedic surgery for ankle. Pt denies diarrhea, vomiting and any other Sx or complaints. Patient is a 80 y.o. male presenting with abdominal pain. The history is provided by the patient. No  was used. Abdominal Pain    This is a new problem. The current episode started 6 to 12 hours ago (9 hours ago). The problem occurs constantly. The problem has not changed since onset. The pain is located in the LLQ and RLQ. The pain is at a severity of 8/10. Associated symptoms include nausea and back pain. Pertinent negatives include no diarrhea and no vomiting. Written by TARAH Reeves, as dictated by Nixon. Jerri Gambino MD    Past Medical History:   Diagnosis Date    Arthritis     Hypertension     20yrs       Past Surgical History:   Procedure Laterality Date    HX BACK SURGERY      laminectomy    HX CATARACT REMOVAL      HX ORTHOPAEDIC  17y/o    broken right arm     HX ORTHOPAEDIC  1995    broke right ankle screws    HX TONSILLECTOMY           Family History:   Problem Relation Age of Onset    Cancer Mother      Colon       Social History     Social History    Marital status:      Spouse name: N/A    Number of children: N/A    Years of education: N/A     Occupational History    Not on file.      Social History Main Topics    Smoking status: Former Smoker    Smokeless tobacco: Not on file    Alcohol use 1.0 oz/week     2 Cans of beer per week      Comment: daily    Drug use: No    Sexual activity: Not on file     Other Topics Concern    Not on file     Social History Narrative         ALLERGIES: Venom-wasp and Pcn [penicillins]    Review of Systems   Gastrointestinal: Positive for abdominal pain and nausea. Negative for diarrhea and vomiting. Musculoskeletal: Positive for back pain. All other systems reviewed and are negative. Vitals:    05/24/17 1851 05/25/17 0001 05/25/17 0411 05/25/17 0706   BP: 121/75 114/63 121/70 117/61   Pulse: 99 72 77 72   Resp: 16 16 16 16   Temp: 98 °F (36.7 °C) 97.7 °F (36.5 °C) 97.9 °F (36.6 °C) 98.3 °F (36.8 °C)   SpO2: 94% 93% 93% 94%   Weight:  94.7 kg (208 lb 12.4 oz)     Height:                Physical Exam   Constitutional: He is oriented to person, place, and time. He appears well-developed and well-nourished. No distress. Uncomfortable appearing elderly male. Appears diaphoretic, toxic appearing   HENT:   Head: Normocephalic and atraumatic. Right Ear: External ear normal.   Left Ear: External ear normal.   Mouth/Throat: Oropharynx is clear and moist. Mucous membranes are dry. No oropharyngeal exudate. Eyes: Conjunctivae and EOM are normal. Pupils are equal, round, and reactive to light. No scleral icterus. mild pallor   Neck: Normal range of motion. Neck supple. No JVD present. No tracheal deviation present. No thyromegaly present. Cardiovascular: Regular rhythm and normal heart sounds. Bradycardia present. Pulmonary/Chest: Effort normal and breath sounds normal. No stridor. No respiratory distress. Lungs clear   Abdominal: Soft. Bowel sounds are normal. He exhibits no distension. There is tenderness in the left lower quadrant. There is no rebound and no guarding. Musculoskeletal: Normal range of motion. He exhibits no edema or tenderness. No soft tissue injuries   Lymphadenopathy:     He has no cervical adenopathy. Neurological: He is alert and oriented to person, place, and time. He has normal reflexes. No cranial nerve deficit. Coordination normal.   Skin: No rash noted. He is diaphoretic. No erythema. cool and diaphoretic   Psychiatric: He has a normal mood and affect. His behavior is normal. Judgment and thought content normal.   Nursing note and vitals reviewed. RESULTS:      PULSE OXIMETRY NOTE:  Pulse-ox is 90% on RA    EKG interpretation: (Preliminary)  Sinus bradycardia, U waves V1V2V3, 51 bpm,   EKG read by [employer]. Talon Rodriguez MD  at 4:38 AM     X-RAY FINDINGS:  6:55 AM  Chest x-ray shows no infiltrate no effusion and borderline cardiomegaly  Pending review by Radiologist  Recorded by Emanuel Dumont ED Scribe, as dictated by [employer]. Talon Rodriguez MD       CT ABD PELV W CONT   Final Result   IMPRESSION:     Dilated proximal to mid small bowel loops with transition to decompressed distal  small bowel loops in the right lower abdomen consistent with early or partial  small bowel obstruction.     Small amount of free fluid. .  As read by the radiologist.    XR CHEST PORT    (Results Pending)        Labs Reviewed   METABOLIC PANEL, COMPREHENSIVE - Abnormal; Notable for the following:        Result Value    Sodium 135 (*)     Chloride 99 (*)     Glucose 165 (*)     BUN 21 (*)     BUN/Creatinine ratio 22 (*)     Globulin 4.2 (*)     All other components within normal limits   URINALYSIS W/ RFLX MICROSCOPIC - Abnormal; Notable for the following:     Specific gravity >1.030 (*)     All other components within normal limits   CBC WITH AUTOMATED DIFF - Abnormal; Notable for the following:     WBC 17.8 (*)     MPV 8.8 (*)     NEUTROPHILS 82 (*)     LYMPHOCYTES 8 (*)     ABS. NEUTROPHILS 14.7 (*)     ABS. MONOCYTES 1.6 (*)     All other components within normal limits   CBC WITH AUTOMATED DIFF - Abnormal; Notable for the following:     WBC 14.5 (*)     ABS.  NEUTROPHILS 10.5 (*)     All other components within normal limits   CBC WITH AUTOMATED DIFF - Abnormal; Notable for the following:     RBC 4.39 (*)     MPV 9.1 (*)     MONOCYTES 11 (*)     All other components within normal limits   METABOLIC PANEL, COMPREHENSIVE - Abnormal; Notable for the following:     Glucose 100 (*)     Calcium 8.3 (*)     Albumin 2.9 (*)     All other components within normal limits   PROTHROMBIN TIME + INR   PTT   CARDIAC PANEL,(CK, CKMB & TROPONIN)   LIPASE   MAGNESIUM   DRUG SCREEN, URINE   PROTHROMBIN TIME + INR   PTT   LIPASE   CARDIAC PANEL,(CK, CKMB & TROPONIN)   MAGNESIUM   METABOLIC PANEL, COMPREHENSIVE   METABOLIC PANEL, BASIC   CBC WITH AUTOMATED DIFF       Recent Results (from the past 12 hour(s))   CBC WITH AUTOMATED DIFF    Collection Time: 05/25/17  5:12 AM   Result Value Ref Range    WBC 8.7 4.6 - 13.2 K/uL    RBC 4.39 (L) 4.70 - 5.50 M/uL    HGB 13.4 13.0 - 16.0 g/dL    HCT 40.1 36.0 - 48.0 %    MCV 91.3 74.0 - 97.0 FL    MCH 30.5 24.0 - 34.0 PG    MCHC 33.4 31.0 - 37.0 g/dL    RDW 14.4 11.6 - 14.5 %    PLATELET 218 208 - 695 K/uL    MPV 9.1 (L) 9.2 - 11.8 FL    NEUTROPHILS 63 40 - 73 %    LYMPHOCYTES 22 21 - 52 %    MONOCYTES 11 (H) 3 - 10 %    EOSINOPHILS 4 0 - 5 %    BASOPHILS 0 0 - 2 %    ABS. NEUTROPHILS 5.4 1.8 - 8.0 K/UL    ABS. LYMPHOCYTES 1.9 0.9 - 3.6 K/UL    ABS. MONOCYTES 1.0 0.05 - 1.2 K/UL    ABS. EOSINOPHILS 0.4 0.0 - 0.4 K/UL    ABS. BASOPHILS 0.0 0.0 - 0.06 K/UL    DF AUTOMATED     METABOLIC PANEL, COMPREHENSIVE    Collection Time: 05/25/17  5:12 AM   Result Value Ref Range    Sodium 137 136 - 145 mmol/L    Potassium 4.1 3.5 - 5.5 mmol/L    Chloride 105 100 - 108 mmol/L    CO2 26 21 - 32 mmol/L    Anion gap 6 3.0 - 18 mmol/L    Glucose 100 (H) 74 - 99 mg/dL    BUN 15 7.0 - 18 MG/DL    Creatinine 0.92 0.6 - 1.3 MG/DL    BUN/Creatinine ratio 16 12 - 20      GFR est AA >60 >60 ml/min/1.73m2    GFR est non-AA >60 >60 ml/min/1.73m2    Calcium 8.3 (L) 8.5 - 10.1 MG/DL    Bilirubin, total 0.5 0.2 - 1.0 MG/DL    ALT (SGPT) 18 16 - 61 U/L    AST (SGOT) 20 15 - 37 U/L    Alk.  phosphatase 56 45 - 117 U/L    Protein, total 6.5 6.4 - 8.2 g/dL    Albumin 2.9 (L) 3.4 - 5.0 g/dL    Globulin 3.6 2.0 - 4.0 g/dL    A-G Ratio 0.8 0.8 - 1.7           MDM  Number of Diagnoses or Management Options  Diagnosis management comments: DDx: Wide range of differentials.  Cardiac anomaly, inferior wall MI, ACS, PNA, vascular event, ischemic or infected bowel, ileus or bladder infection       Amount and/or Complexity of Data Reviewed  Clinical lab tests: reviewed and ordered (Magnesium, Lipase, Cardiac panel, Prothrombin time +INR, Comprehensive metabolic panel, Urinalysis w/ RFLX mciroscopic)  Tests in the radiology section of CPT®: ordered and reviewed (CT abd pelv w cont, Chest x-ray)  Tests in the medicine section of CPT®: reviewed and ordered (EKG)  Independent visualization of images, tracings, or specimens: yes (Chest x-ray, EKG)      ED Course     Medications   furosemide (LASIX) tablet 40 mg (40 mg Oral Given 5/25/17 0822)   latanoprost (XALATAN) 0.005 % ophthalmic solution 1 Drop (0 Drops Both Eyes Held 5/24/17 2200)   sodium chloride (NS) flush 5-10 mL ( IntraVENous Canceled Entry 5/24/17 2200)   sodium chloride (NS) flush 5-10 mL (not administered)   ketorolac (TORADOL) injection 15 mg (15 mg IntraVENous Given 5/24/17 1856)   HYDROmorphone (PF) (DILAUDID) injection 1 mg (1 mg IntraVENous Given 5/24/17 1345)   0.9% sodium chloride infusion (0 mL/hr IntraVENous Stopped 5/24/17 1600)   ondansetron (ZOFRAN) injection 4 mg (4 mg IntraVENous Given 5/24/17 1125)   dextrose 5% - 0.45% NaCl with KCl 20 mEq/L infusion (125 mL/hr IntraVENous New Bag 5/25/17 0106)   iopamidol (ISOVUE 300) 61 % contrast injection 100 mL (100 mL IntraVENous Given 5/24/17 0603)   acetaminophen (OFIRMEV) infusion 1,000 mg (1,000 mg IntraVENous Given 5/24/17 0500)   sodium chloride 0.9 % bolus infusion 1,000 mL (0 mL IntraVENous IV Completed 5/24/17 1022)   ondansetron (ZOFRAN) injection 4 mg (4 mg IntraVENous Given 5/24/17 3696)   morphine injection 2 mg (2 mg IntraVENous Given 5/24/17 1125)   barium sulfate (EZ PAQUE) 60 % (w/v) contrast susp 1-355 mL (355 mL Oral Given 5/25/17 1213)   barium sulfate (EZ PAQUE) 60 % (w/v) contrast susp 1-355 mL (150 mL Oral Given 5/25/17 1214)      Procedures  PROGRESS NOTE:  4:35 AM  Initial assessment performed. Written by Fern Marie ED Scribe, as dictated by Sandy Barnes. Gabriele Streeter MD     CONSULT NOTE:   7:47 AM   Sandy Barnes. Gabriele Streeter MD  spoke with Neha Javed MD    Specialty: General Surgery  Discussed pt's hx, disposition, and available diagnostic and imaging results. Reviewed care plans. Consulting physician agrees with plans as outlined. Agrees to admit pt to medical.   Written by TARAH Salgadoibfarooq, as dictated by Sandy Barnes. Gabriele Streeter MD      ADMISSION NOTE:  7:47 AM   Patient is being admitted to the hospital by Neha Javed MD . The results of their tests and reasons for their admission have been discussed with them and/or available family. They convey agreement and understanding for the need to be admitted and for their admission diagnosis. Written by TARAH Salgado, as dictated by Sandy Barnes. Gabriele Streeter MD .     CONSULT NOTE:   8:17 AM   Sandy Barnes. Gabriele Streeter MD  spoke with Avril Schneider MD    Specialty: Hospitalist  Discussed pt's hx, disposition, and available diagnostic and imaging results. Reviewed care plans. Consulting physician agrees with plans as outlined. Agrees to consult for medical treatment of this patientl. Written by Cara Streeter MD     CONDITIONS ON ADMISSION:  7:47 AM   Deep Vein Thrombosis is not present at the time of admission. Thrombosis is not present at the time of admission. Urinary Tract Infection is not present at the time of admission. Pneumonia is not present at the time of admission. MRSA is not present at the time of admission. Wound infection is not present at the time of admission. Pressure Ulcer is not present at the time of admission.     Written TARAH Carballo, as dictated by Jayashree Lynch. Ramin Kline MD .    CLINICAL IMPRESSION:    1. Partial small bowel obstruction (HCC)    2. Dehydration    3. Bradycardia    4. Hyponatremia        PLAN: Admit to Medical  ATTESTATIONS:  This note is prepared by Oswaldo Germain, acting as Scribe for Jayashree Lynch. Ramin Kline MD .    Jayashree Lynch. Ramin Kline MD : The scribe's documentation has been prepared under my direction and personally reviewed by me in its entirety. I confirm that the note above accurately reflects all work, treatment, procedures, and medical decision making performed by me.

## 2017-05-24 NOTE — ED NOTES
Pt back in bed from CT. Monitors on. VSS on 2L O2 NC. Call bell within reach. Pt states that his nausea and abdominal upset feels \"alot better\".

## 2017-05-24 NOTE — ROUTINE PROCESS
TRANSFER - OUT REPORT:    Verbal report given to Barbie العراقي RN(name) on Bryson Button  being transferred to medical floor(unit) for routine progression of care       Report consisted of patients Situation, Background, Assessment and   Recommendations(SBAR). sats drop at times 94% at time of tranfer  Information from the following report(s) SBAR, Kardex, ED Summary and MAR was reviewed with the receiving nurse. Lines:   Peripheral IV 05/24/17 Left Antecubital (Active)   Site Assessment Clean, dry, & intact 5/24/2017  6:00 AM   Phlebitis Assessment 0 5/24/2017  6:00 AM   Infiltration Assessment 0 5/24/2017  6:00 AM   Dressing Status Clean, dry, & intact 5/24/2017  6:00 AM   Dressing Type Transparent 5/24/2017  6:00 AM   Hub Color/Line Status Pink;Patent 5/24/2017  6:00 AM        Opportunity for questions and clarification was provided.       Patient transported with:   O2 @ 4 liters  Registered Nurse

## 2017-05-24 NOTE — PROGRESS NOTES
1244- Pt arrived to floor. Physical assessment and morning meds passed at this time. Pt oriented to room. Call light and phone within reach. Room clutter free. Nurse will continue to monitor    1300- Attempted to place NG tube unsuccessful. Paged Dr. Johana Dyer to make aware. 26- Spoke with Dr. Johana Dyer. Nurses to try again. [address] with Garo Strange in pharmacy regarding pt's medicines not being accurate. This nurse called pt's wife at home to request updated at home medicines. Wife states that this was taken in the emergency room and was \"very disappointed because I already gave this information to someone downstairs and it didn't go to where it needed to be. \" Wife asked on update of pt condition and nurse made wife aware that NG tube insertion was not successful. Wife upset at this time. 5-  Spoke with Dr. Johana Dyer regarding pt's at home medications not being accurate. In his note he states to hold diuretics but did not discontinue in [facility]. Wife bedside. Nurse manager in room attempting to place NG tube. Unsuccessful. Made Dr. Johana Dyer aware. Per Dr. Johana Dyer he will get ahold of Dr. Keiry Mims to make aware of situation. 0- Paged Dr. Johana Dyer to clarify if needed to get ahold of Dr. Keiry Mims. This nurse to page     3681- Paged Dr. Keiry Mims, Doctor in the middle of a case and nurse in [address] is to leave message to call when finishing case. 0695- Dr. Keiry Mims returned call. NG tube to remain out at this time. Pt and wife made aware. 36- Family came to nurses station stating that pt was in pain. Nurse in to see pt and states that he isn't in pain and doesn't want medicine. Pt educated to call when he is having pain. Verbalized understanding.

## 2017-05-24 NOTE — ED NOTES
Attempted by Ava Ramirez and William Gleason to place Ng however unsuccessful and patient states to stop for now. Dr. Zoe dowell.

## 2017-05-25 ENCOUNTER — APPOINTMENT (OUTPATIENT)
Dept: GENERAL RADIOLOGY | Age: 82
DRG: 390 | End: 2017-05-25
Attending: SURGERY
Payer: MEDICARE

## 2017-05-25 LAB
ALBUMIN SERPL BCP-MCNC: 2.9 G/DL (ref 3.4–5)
ALBUMIN/GLOB SERPL: 0.8 {RATIO} (ref 0.8–1.7)
ALP SERPL-CCNC: 56 U/L (ref 45–117)
ALT SERPL-CCNC: 18 U/L (ref 16–61)
ANION GAP BLD CALC-SCNC: 6 MMOL/L (ref 3–18)
AST SERPL W P-5'-P-CCNC: 20 U/L (ref 15–37)
BASOPHILS # BLD AUTO: 0 K/UL (ref 0–0.06)
BASOPHILS # BLD: 0 % (ref 0–2)
BILIRUB SERPL-MCNC: 0.5 MG/DL (ref 0.2–1)
BUN SERPL-MCNC: 15 MG/DL (ref 7–18)
BUN/CREAT SERPL: 16 (ref 12–20)
CALCIUM SERPL-MCNC: 8.3 MG/DL (ref 8.5–10.1)
CHLORIDE SERPL-SCNC: 105 MMOL/L (ref 100–108)
CO2 SERPL-SCNC: 26 MMOL/L (ref 21–32)
CREAT SERPL-MCNC: 0.92 MG/DL (ref 0.6–1.3)
DIFFERENTIAL METHOD BLD: ABNORMAL
EOSINOPHIL # BLD: 0.4 K/UL (ref 0–0.4)
EOSINOPHIL NFR BLD: 4 % (ref 0–5)
ERYTHROCYTE [DISTWIDTH] IN BLOOD BY AUTOMATED COUNT: 14.4 % (ref 11.6–14.5)
GLOBULIN SER CALC-MCNC: 3.6 G/DL (ref 2–4)
GLUCOSE SERPL-MCNC: 100 MG/DL (ref 74–99)
HCT VFR BLD AUTO: 40.1 % (ref 36–48)
HGB BLD-MCNC: 13.4 G/DL (ref 13–16)
LYMPHOCYTES # BLD AUTO: 22 % (ref 21–52)
LYMPHOCYTES # BLD: 1.9 K/UL (ref 0.9–3.6)
MCH RBC QN AUTO: 30.5 PG (ref 24–34)
MCHC RBC AUTO-ENTMCNC: 33.4 G/DL (ref 31–37)
MCV RBC AUTO: 91.3 FL (ref 74–97)
MONOCYTES # BLD: 1 K/UL (ref 0.05–1.2)
MONOCYTES NFR BLD AUTO: 11 % (ref 3–10)
NEUTS SEG # BLD: 5.4 K/UL (ref 1.8–8)
NEUTS SEG NFR BLD AUTO: 63 % (ref 40–73)
PLATELET # BLD AUTO: 166 K/UL (ref 135–420)
PMV BLD AUTO: 9.1 FL (ref 9.2–11.8)
POTASSIUM SERPL-SCNC: 4.1 MMOL/L (ref 3.5–5.5)
PROT SERPL-MCNC: 6.5 G/DL (ref 6.4–8.2)
RBC # BLD AUTO: 4.39 M/UL (ref 4.7–5.5)
SODIUM SERPL-SCNC: 137 MMOL/L (ref 136–145)
WBC # BLD AUTO: 8.7 K/UL (ref 4.6–13.2)

## 2017-05-25 PROCEDURE — 74011250636 HC RX REV CODE- 250/636: Performed by: SURGERY

## 2017-05-25 PROCEDURE — 74011000255 HC RX REV CODE- 255: Performed by: SURGERY

## 2017-05-25 PROCEDURE — 65270000029 HC RM PRIVATE

## 2017-05-25 PROCEDURE — 85025 COMPLETE CBC W/AUTO DIFF WBC: CPT | Performed by: FAMILY MEDICINE

## 2017-05-25 PROCEDURE — 80053 COMPREHEN METABOLIC PANEL: CPT | Performed by: FAMILY MEDICINE

## 2017-05-25 PROCEDURE — 97166 OT EVAL MOD COMPLEX 45 MIN: CPT

## 2017-05-25 PROCEDURE — 97535 SELF CARE MNGMENT TRAINING: CPT

## 2017-05-25 PROCEDURE — 74011000250 HC RX REV CODE- 250: Performed by: SURGERY

## 2017-05-25 PROCEDURE — 36415 COLL VENOUS BLD VENIPUNCTURE: CPT | Performed by: FAMILY MEDICINE

## 2017-05-25 PROCEDURE — 97116 GAIT TRAINING THERAPY: CPT

## 2017-05-25 PROCEDURE — 74011250637 HC RX REV CODE- 250/637: Performed by: SURGERY

## 2017-05-25 PROCEDURE — 77010033678 HC OXYGEN DAILY

## 2017-05-25 PROCEDURE — 74011250637 HC RX REV CODE- 250/637: Performed by: HOSPITALIST

## 2017-05-25 PROCEDURE — 74250 X-RAY XM SM INT 1CNTRST STD: CPT

## 2017-05-25 RX ORDER — CARVEDILOL 3.12 MG/1
3.12 TABLET ORAL 2 TIMES DAILY WITH MEALS
Status: DISCONTINUED | OUTPATIENT
Start: 2017-05-25 | End: 2017-05-26 | Stop reason: HOSPADM

## 2017-05-25 RX ADMIN — CARVEDILOL 3.12 MG: 3.12 TABLET, FILM COATED ORAL at 17:58

## 2017-05-25 RX ADMIN — BARIUM SULFATE 150 ML: 0.6 SUSPENSION ORAL at 12:14

## 2017-05-25 RX ADMIN — FUROSEMIDE 40 MG: 40 TABLET ORAL at 08:22

## 2017-05-25 RX ADMIN — DEXTROSE MONOHYDRATE, SODIUM CHLORIDE, AND POTASSIUM CHLORIDE 125 ML/HR: 50; 4.5; 1.49 INJECTION, SOLUTION INTRAVENOUS at 01:06

## 2017-05-25 RX ADMIN — Medication 10 ML: at 14:00

## 2017-05-25 RX ADMIN — LATANOPROST 1 DROP: 50 SOLUTION OPHTHALMIC at 22:00

## 2017-05-25 RX ADMIN — BARIUM SULFATE 355 ML: 0.6 SUSPENSION ORAL at 12:13

## 2017-05-25 RX ADMIN — DEXTROSE MONOHYDRATE, SODIUM CHLORIDE, AND POTASSIUM CHLORIDE 125 ML/HR: 50; 4.5; 1.49 INJECTION, SOLUTION INTRAVENOUS at 22:36

## 2017-05-25 RX ADMIN — DEXTROSE MONOHYDRATE, SODIUM CHLORIDE, AND POTASSIUM CHLORIDE 125 ML/HR: 50; 4.5; 1.49 INJECTION, SOLUTION INTRAVENOUS at 14:56

## 2017-05-25 NOTE — PROGRESS NOTES
Shift Summary :  Slept most of shift with no signs of distress or complaints of nausea. Passing gas. No bowel movement.

## 2017-05-25 NOTE — PROGRESS NOTES
Problem: Self Care Deficits Care Plan (Adult)  Goal: *Acute Goals and Plan of Care (Insert Text)  Occupational Therapy Goals  Initiated 5/25/2017 within 3 day(s). 1. Patient will perform lower body dressing with supervision/set-up w/ A/E for sock donning while sitting on EOB  2. Patient will perform grooming with modified independence while standing at sink. 3. Patient will perform toilet transfers with modified independence. 4. Patient will perform all aspects of toileting with modified independence. 5. Patient will perform LE ADLs w/ good body mechanics as evidenced by patients face maintained normal color/palor   6. Patient will utilize energy conservation techniques during functional activities with verbal cue(s). Outcome: Progressing Towards Goal  OCCUPATIONAL THERAPY EVALUATION/DISCHARGE     Patient: Jessy Calderon (89 y.o. male)  Date: 5/25/2017  Primary Diagnosis: Partial small bowel obstruction (HCC)  SBO (small bowel obstruction) (HCC)        Precautions:  Fall      ASSESSMENT AND RECOMMENDATIONS:  Based on the objective data described below, the patient presents with ability to perform ADL tasks at baseline level. Pt able to complete ADLs and spouse verifies that patient is walking and looks as he does normally/at baseline. Pt with pelvic dipping on Left during mobility. Pt able to perform LE sock donning w/ great effort and face turning purple as well as patient utilizing forward flexion. Pt instructed on body mechanics and with back fusion as well as OA and HTN, pt would benefit from alternative strategy. Pt has sock aide, but reports he doesn't use it. Spouse present and educated on benefits of use d/t lack of hip ROM for alternative strategies. Pt with no further OT/ADL concerns at this time as goals met during tx. Skilled occupational therapy is not indicated at this time.      Education: Reviewed Back Precautions s/p fusion (strain above/below fusion), body mechanics, home safety, adaptive strategies and adaptive dressing techniques including clothing modifications with patient verbalizing understanding at this time. Discharge Recommendations: per PT  Further Equipment Recommendations for Discharge: N/A        SUBJECTIVE:   Patient stated I'm fine. I can do everything for myself. I don't think I need Occupational Therapy.       OBJECTIVE DATA SUMMARY:       Past Medical History:   Diagnosis Date    Arthritis      Hypertension       20yrs     Past Surgical History:   Procedure Laterality Date    HX BACK SURGERY         laminectomy    HX CATARACT REMOVAL        HX ORTHOPAEDIC   17y/o     broken right arm     HX ORTHOPAEDIC   1995     broke right ankle screws    HX TONSILLECTOMY         Barriers to Learning/Limitations: yes;  cognitive  Compensate with: visual, verbal, tactile, kinesthetic cues/model     G-Codes (GP)  Self Care   Current  CI= 1-19%   Goal  CI= 1-19%   D/C  CI= 1-19%  The severity rating is based on the professional judgement & direct observation of Level of Assistance required for Functional Mobility and ADLs. Eval Complexity: History: MEDIUM Complexity : Expanded review of history including physical, cognitive and psychosocial  history ; Examination: MEDIUM Complexity : 3-5 performance deficits relating to physical, cognitive , or psychosocial skils that result in activity limitations and / or participation restrictions; Decision Making:MEDIUM Complexity : Patient may present with comorbidities that affect occupational performnce.  Miniml to moderate modification of tasks or assistance (eg, physical or verbal ) with assesment(s) is necessary to enable patient to complete evaluation      Prior Level of Function/Home Situation:   Home Situation  Home Environment: Private residence  # Steps to Enter: 4  Rails to Enter: Yes  Hand Rails : Bilateral  One/Two Story Residence: Two story  # of Interior Steps: 12 (bedroom downstairs)  Interior Rails: Both  Living Alone: No  Support Systems: Spouse/Significant Other/Partner  Patient Expects to be Discharged to[de-identified] Private residence  Current DME Used/Available at Home: Cyrilla Crape, straight, Walker, rolling, Safety frame [employer], Shower chair, Wheelchair, Brace/Splint  Tub or Shower Type: Shower  [X]     Right hand dominant       [ ]     Left hand dominant  Cognitive/Behavioral Status:  Neurologic State: Alert  Orientation Level: Oriented X4  Cognition: Follows commands  Safety/Judgement: Awareness of environment  Coordination:  Coordination: Within functional limits  Fine Motor Skills-Upper: Left Intact; Right Intact    Gross Motor Skills-Upper: Left Intact; Right Intact  Balance:  Sitting: Intact  Standing: Intact; With support  Strength:  Strength: Generally decreased, functional  Tone & Sensation:  Tone: Normal  Sensation: Intact  Range of Motion:  AROM: Generally decreased, functional  PROM: Generally decreased, functional  Functional Mobility and Transfers for ADLs:  Bed Mobility:  Supine to Sit: Modified independent  Sit to Supine: Modified independent  Scooting: Modified independent  Transfers:  Sit to Stand: Supervision;Modified independent  Bed to Chair: Supervision;Modified independent              Toilet Transfer : Supervision;Modified independent              Bathroom Mobility: Modified independent  ADL Assessment:  Feeding: Independent     Oral Facial Hygiene/Grooming: Supervision;Modified Independent     Bathing: Setup;Modified independent     Upper Body Dressing: Setup     Lower Body Dressing: Contact guard assistance     Toileting: Supervision;Modified independent     ADL Intervention:  Grooming  Washing Hands: Modified independent     Lower Body Dressing Assistance  Underpants: Supervision/set-up  Pants With Elastic Waist: Supervision/set-up  Socks: Contact guard assistance  Position Performed: Seated edge of bed  Adaptive Equipment Used:  (pt owns sock aide, but doesn't use)     Toileting  Toileting Assistance: Supervision/set up;Modified independent  Bladder Hygiene: Modified independent  Clothing Management: Modified independent     Cognitive Retraining  Safety/Judgement: Awareness of environment     Pain:  Pre-treatment: 0/10  Post-treatment: 0/10  Activity Tolerance:   Pt able to stand 5 minute(s). Pt able to complete ADLs with intermittent rest breaks. Please refer to the flowsheet for vital signs taken during this treatment. After treatment:   [ ]  Patient left in no apparent distress sitting up in chair  [X]  Patient left in no apparent distress in bed  [X]  Call bell left within reach  [X]  Nursing notified  [X]  Caregiver present  [ ]  Bed alarm activated      COMMUNICATION/EDUCATION:   Communication/Collaboration:  [X]      Home safety education was provided and the patient/caregiver indicated understanding. [X]      Patient/family have participated as able and agree with findings and recommendations. [ ]      Patient is unable to participate in plan of care at this time.      Thank you for this referral.  Margareth Tejeda, OTR/L  Time Calculation: 28 mins

## 2017-05-25 NOTE — PROGRESS NOTES
Problem: Mobility Impaired (Adult and Pediatric)  Goal: *Acute Goals and Plan of Care (Insert Text)  Physical Therapy Goals  Initiated 5/24/2017 and to be accomplished within 3-5 day(s)  1. Patient will move from supine <> sit with S in prep for out of bed activity and change of position. 2. Patient will perform sit<> stand with S with LRAD in prep for transfers/ambulation. 3. Patient will transfer from bed <> chair with S with LRAD for time up in chair for completion of ADL activity. 4. Patient will ambulate 150 feet with LRAD for increased functional mobility at discharge. 5. Patient will ascend/descend 3-5 stairs with handrail(s) with minimal assistance/contact guard assist for home re-entry as needed. Attempted PT treatment. Patient is downstairs for X-ray/imaging. Will follow up later for PT treatment as patient's schedule permits.      Que Crawford PT

## 2017-05-25 NOTE — PROGRESS NOTES
Hospitalist Progress Note    Patient: Chaz Barajas MRN: [id number]  CSN: [id number]    YOB: 1934  Age: 80 y.o.   Sex: male    DOA: 5/24/2017 LOS:  LOS: 1 day          Chief Complaint:    Partial SBO      Assessment/Plan   Partial SBO-SB series correlates this  HTN  Aortic valve disease  SARAH  Prior back surgery    Clinically better, has some right sided abd pain, but no N/V  Had BM today  Feels hungry  No fevers, no hematochezia    Will review with surgery  Trial clears, and monitor, continue IVF  Hold his lasix  DVT proph  Monitor  disussed with pt and wofe    Patient Active Problem List   Diagnosis Code    Acquired spondylolisthesis M43.10    Unspecified pleural effusion J90    Chronic airway obstruction, not elsewhere classified J44.9    SBO ([facility] bowel obstruction) ([facility]) K56.69    Ileus ([facility]) K56.7    SARAH (obstructive sleep apnea) G47.33    Partial small bowel obstruction (HCC) K56.69    Dehydration E86.0    Bradycardia R00.1    Hyponatremia E87.1    Essential hypertension I10       Subjective:    rigth side of abd feels sore  No nausea  No cramps  No diarrhea  Feeling hungry    Review of systems:    Constitutional: denies fevers, chills, myalgias  Respiratory: denies SOB, cough  Cardiovascular: denies chest pain, palpitations  Gastrointestinal: denies nausea, vomiting, diarrhea      Vital signs/Intake and Output:  Visit Vitals    /56    Pulse 75    Temp 97.5 °F (36.4 °C)    Resp 16    Ht 5' 10\" (1.778 m)    Wt 94.7 kg (208 lb 12.4 oz)    SpO2 94%    BMI 29.96 kg/m2     Current Shift:     Last three shifts:  05/23 1901 - 05/25 0700  In: 1565.1 [I.V.:1565.1]  Out: 430 [Urine:430]    Exam:    General: Well developed, alert, NAD, OX3  Head/Neck: NCAT, supple, No masses, No lymphadenopathy  CVS:Regular rate and rhythm, no M/R/G, S1/S2 heard, no thrill  Lungs:Clear to auscultation bilaterally, no wheezes, rhonchi, or rales  Abdomen: Soft, Nontender, No distention, some normal and some hi pitched Bowel sounds, No hepatomegaly  Extremities: No C/C/E, pulses palpable 2+  Skin:normal texture and turgor, no rashes, no lesions  Neuro:grossly normal , follows commands  Psych:appropriate                Labs: Results:       Chemistry Recent Labs      05/25/17 0512 05/24/17 0442   GLU  100*  165*   NA  137  135*   K  4.1  4.0   CL  105  99*   CO2  26  28   BUN  15  21*   CREA  0.92  0.97   CA  8.3*  9.4   AGAP  6  8   BUCR  16  22*   AP  56  75   TP  6.5  8.0   ALB  2.9*  3.8   GLOB  3.6  4.2*   AGRAT  0.8  0.9      CBC w/Diff Recent Labs      05/25/17   0512 05/24/17   1334  05/24/17 0442   WBC  8.7  17.8*  14.5*   RBC  4.39*  4.75  4.93   HGB  13.4  14.7  15.3   HCT  40.1  42.2  44.0   PLT  166  184  216   GRANS  63  82*  73   LYMPH  22  8*  21   EOS  4  1  1      Cardiac Enzymes Recent Labs      05/24/17 0442   CPK  96   CKND1  2.0      Coagulation Recent Labs      05/24/17 0442   PTP  13.3   INR  1.0   APTT  28.1       Lipid Panel No results found for: CHOL, CHOLPOCT, CHOLX, CHLST, CHOLV, 495663, HDL, LDL, NLDLCT, DLDL, LDLC, DLDLP, 118037, VLDLC, VLDL, TGL, TGLX, TRIGL, [id number], TRIGP, TGLPOCT, [id number], CHHD, CHHDX   BNP No results for input(s): BNPP in the last 72 hours.    Liver Enzymes Recent Labs      05/25/17 0512   TP  6.5   ALB  2.9*   AP  56   SGOT  20      Thyroid Studies No results found for: T4, T3U, TSH, TSHEXT     Procedures/imaging: see electronic medical records for all procedures/Xrays and details which were not copied into this note but were reviewed prior to creation of Devon Lane MD

## 2017-05-25 NOTE — PROGRESS NOTES
Noted pt transfer to . Pt admitted due to SBO. Donita Collier Met with pt and his wife at bedside to discuss plan of care. Pt has a walker, cane and a wheel chair, however, he is not currently using any of these. Noted recommendation for home health. Pt/family have indicated they are fine and do not need anything. CM to follow and assist as needed. Discharge Reassessment Plan:  High Risk and MSSP/Good Help ACO patients    RRAT Score:  21 or greater    High Risk Care Transition Interventions:  1. Discharge transition plan:  Physician follow up  2. Involved patient/caregiver in assessment, planning, education and implement of intervention. 3. CM daily patient care huddles/interdisciplinary rounds were completed. 4. PCP/Specialist appointment to be scheduled within 48 hours unless otherwise specified. 5. Facilitated transportation and logistics for follow-up appointments. 6. Facilitated Medication reconciliation  Pharmacy. Date/Time  7. Formal handoff between hospital provider and post-acute provider to transition patient completed. 8. Handoff to [facility] Nurse Navigator or PCP practice completed. Discharge follow-up phone call within 2  4 days (NN, [facility], Nursing) CM follow up as assigned. Care Management Interventions  PCP Verified by CM: Yes  Mode of Transport at Discharge:  Other (see comment) (Family/wife)  Transition of Care Consult (CM Consult): Discharge Planning  Health Maintenance Reviewed: Yes  Physical Therapy Consult: Yes  Occupational Therapy Consult: Yes  Current Support Network: Lives with Spouse  Confirm Follow Up Transport: Family  Plan discussed with Pt/Family/Caregiver: Yes  Discharge Location  Discharge Placement: Home

## 2017-05-25 NOTE — PROGRESS NOTES
AFVSS  Feels better. No N/V, could not place NG. He has hx of difficult NG placement. Abd - soft, ND, benign    Will get a small bowel series to look for mass or mechanical obstruction since this is his second time of this happening.

## 2017-05-25 NOTE — PROGRESS NOTES
conducted an initial consultation and Spiritual Assessment for Enid Acevedo, who is a 80 y. o.,male. Patients Primary Language is: [place]. According to the patients EMR Pentec[employer]al Affiliation is: [employer]. The reason the Patient came to the hospital is:   Patient Active Problem List    Diagnosis Date Noted    Partial small bowel obstruction ([facility] [address] 05/24/2017    Dehydration 05/24/2017    Bradycardia 05/24/2017    Hyponatremia 05/24/2017    Essential hypertension 05/24/2017    SARAH (obstructive sleep apnea) 01/15/2014    Chronic airway obstruction, not elsewhere classified 01/05/2014    SBO (small bowel obstruction) ([facility] [address] 01/05/2014    Ileus ([facility] [address] 01/05/2014    Unspecified pleural effusion 01/04/2014    Acquired spondylolisthesis 12/29/2013        The  provided the following Interventions:  Initiated a relationship of care and support. Explored issues of eleanor, belief, spirituality and Hoahaoism/ritual needs while hospitalized. Listened empathically. Provided chaplaincy education. Provided information about Spiritual Care Services. Offered prayer and assurance of continued prayers on patients behalf. Chart reviewed. The following outcomes were achieved:  Patient shared limited information about both their medical narrative and spiritual journey/beliefs. Patient processed feeling about current hospitalization. Patient expressed gratitude for pastoral care visit. Assessment:  Patient does not have any Hoahaoism/cultural needs that will affect patients preferences in health care. There are no further spiritual or Hoahaoism issues which require intervention at this time. Plan:  Chaplains will continue to follow and will provide pastoral care on an as needed/requested basis.  recommends bedside caregivers page  on duty if patient shows signs of acute spiritual or emotional distress.       Sister Nehemiah Gar MA, [facility]  [phone]

## 2017-05-25 NOTE — ROUTINE PROCESS
Bedside and Verbal shift change report given to Carly Espinosa (oncoming nurse) by Lorenzo Tom RN   (offgoing nurse). Report included the following information SBAR, Kardex, Intake/Output and MAR.

## 2017-05-25 NOTE — PROGRESS NOTES
Problem: Mobility Impaired (Adult and Pediatric)  Goal: *Acute Goals and Plan of Care (Insert Text)  Physical Therapy Goals  Initiated 5/24/2017 and to be accomplished within 3-5 day(s)  1. Patient will move from supine <> sit with S in prep for out of bed activity and change of position. 2. Patient will perform sit<> stand with S with LRAD in prep for transfers/ambulation. 3. Patient will transfer from bed <> chair with S with LRAD for time up in chair for completion of ADL activity. 4. Patient will ambulate 150 feet with LRAD for increased functional mobility at discharge. 5. Patient will ascend/descend 3-5 stairs with handrail(s) with minimal assistance/contact guard assist for home re-entry as needed. Outcome: Progressing Towards Goal  PHYSICAL THERAPY TREATMENT     Patient: Aristeo Bower (21 y.o. male)  Date: 5/25/2017  Diagnosis: Partial small bowel obstruction (HCC)  SBO (small bowel obstruction) (HCC) Partial small bowel obstruction (HCC)       Precautions: Fall   Chart, physical therapy assessment, plan of care and goals were reviewed. ASSESSMENT:  The patient participated in gait training this session. Patient demonstrates antalgic gait pattern with his left hip dipping/buckling in stance. Patient reports that he has walked this way since his back surgery, which was about 3-4 years ago. Patient ambulated about 100 feet without an assistive device. O2 saturations dropped to about 86% while on room air. Nursing entered and placed patient back on 2L O2. Will continue PT to maximize the quality of the patient's gait and activity tolerance. Recommend home health at discharge. Progression toward goals:  [X]      Improving appropriately and progressing toward goals  [ ]      Improving slowly and progressing toward goals  [ ]      Not making progress toward goals and plan of care will be adjusted       PLAN:  Patient continues to benefit from skilled intervention to address the above impairments. Continue treatment per established plan of care. Discharge Recommendations:  Home Health  Further Equipment Recommendations for Discharge:  N/A       SUBJECTIVE:   Patient stated My left leg has been hurting since my back surgery.       OBJECTIVE DATA SUMMARY:   Critical Behavior:  Neurologic State: Alert  Orientation Level: Oriented X4  Cognition: Follows commands  Safety/Judgement: Awareness of environment  Functional Mobility Training:  Bed Mobility:  Supine to Sit: Modified independent  Sit to Supine: Modified independent  Scooting: Modified independent  Transfers:  Sit to Stand: Supervision  Stand to Sit: Supervision  Bed to Chair: Supervision;Modified independent  Balance:  Sitting: Intact  Standing: Impaired; Without support  Standing - Static: Fair;Good  Standing - Dynamic : Fair  Ambulation/Gait Training:  Distance (ft): 100 Feet (ft)  Assistive Device: Gait belt  Ambulation - Level of Assistance: Contact guard assistance  Gait Abnormalities: Antalgic;Decreased step clearance; Step to gait  Base of Support: Narrowed  Speed/Tara: Slow  Step Length: Right shortened;Left shortened  Swing Pattern: Left asymmetrical  Interventions: Safety awareness training;Verbal cues     Pain:  Pain Scale 1: Numeric (0 - 10)  Pain Intensity 1: 0  Activity Tolerance:   Fair  Please refer to the flowsheet for vital signs taken during this treatment.   After treatment:   [ ] Patient left in no apparent distress sitting up in chair  [X] Patient left in no apparent distress in bed  [X] Call bell left within reach  [X] Nursing notified  [ ] Caregiver present  [ ] Bed alarm activated      Milvia Ibarra   Time Calculation: 12 mins

## 2017-05-25 NOTE — ROUTINE PROCESS
Bedside and Verbal shift change report given to ROSS Baker RN  (oncoming nurse) by  AIDAN Anand RN  (offgoing nurse). Report included the following information SBAR, Kardex, Recent Results and Med Rec Status.

## 2017-05-25 NOTE — ROUTINE PROCESS
Bedside and Verbal shift change report given to Pamlea Anand RN (oncoming nurse) by Mariah Mitchell (offgoing nurse). Report included the following information SBAR, Kardex and MAR.

## 2017-05-26 VITALS
DIASTOLIC BLOOD PRESSURE: 57 MMHG | SYSTOLIC BLOOD PRESSURE: 123 MMHG | OXYGEN SATURATION: 95 % | TEMPERATURE: 98.3 F | HEART RATE: 73 BPM | BODY MASS INDEX: 29.89 KG/M2 | RESPIRATION RATE: 16 BRPM | HEIGHT: 70 IN | WEIGHT: 208.78 LBS

## 2017-05-26 PROBLEM — A09 INFECTIOUS COLITIS, ENTERITIS AND GASTROENTERITIS: Status: ACTIVE | Noted: 2017-05-26

## 2017-05-26 LAB
ALBUMIN SERPL BCP-MCNC: 2.9 G/DL (ref 3.4–5)
ALBUMIN/GLOB SERPL: 0.9 {RATIO} (ref 0.8–1.7)
ALP SERPL-CCNC: 51 U/L (ref 45–117)
ALT SERPL-CCNC: 17 U/L (ref 16–61)
ANION GAP BLD CALC-SCNC: 10 MMOL/L (ref 3–18)
AST SERPL W P-5'-P-CCNC: 16 U/L (ref 15–37)
BASOPHILS # BLD AUTO: 0 K/UL (ref 0–0.06)
BASOPHILS # BLD: 0 % (ref 0–2)
BILIRUB SERPL-MCNC: 0.6 MG/DL (ref 0.2–1)
BUN SERPL-MCNC: 10 MG/DL (ref 7–18)
BUN/CREAT SERPL: 13 (ref 12–20)
CALCIUM SERPL-MCNC: 8.3 MG/DL (ref 8.5–10.1)
CHLORIDE SERPL-SCNC: 106 MMOL/L (ref 100–108)
CO2 SERPL-SCNC: 23 MMOL/L (ref 21–32)
CREAT SERPL-MCNC: 0.79 MG/DL (ref 0.6–1.3)
DIFFERENTIAL METHOD BLD: ABNORMAL
EOSINOPHIL # BLD: 0.3 K/UL (ref 0–0.4)
EOSINOPHIL NFR BLD: 5 % (ref 0–5)
ERYTHROCYTE [DISTWIDTH] IN BLOOD BY AUTOMATED COUNT: 14.6 % (ref 11.6–14.5)
GLOBULIN SER CALC-MCNC: 3.4 G/DL (ref 2–4)
GLUCOSE SERPL-MCNC: 106 MG/DL (ref 74–99)
HCT VFR BLD AUTO: 38.6 % (ref 36–48)
HGB BLD-MCNC: 13.1 G/DL (ref 13–16)
LYMPHOCYTES # BLD AUTO: 23 % (ref 21–52)
LYMPHOCYTES # BLD: 1.6 K/UL (ref 0.9–3.6)
MCH RBC QN AUTO: 30.7 PG (ref 24–34)
MCHC RBC AUTO-ENTMCNC: 33.9 G/DL (ref 31–37)
MCV RBC AUTO: 90.4 FL (ref 74–97)
MONOCYTES # BLD: 1.4 K/UL (ref 0.05–1.2)
MONOCYTES NFR BLD AUTO: 20 % (ref 3–10)
NEUTS SEG # BLD: 3.7 K/UL (ref 1.8–8)
NEUTS SEG NFR BLD AUTO: 52 % (ref 40–73)
PLATELET # BLD AUTO: 174 K/UL (ref 135–420)
PMV BLD AUTO: 9 FL (ref 9.2–11.8)
POTASSIUM SERPL-SCNC: 3.9 MMOL/L (ref 3.5–5.5)
PROT SERPL-MCNC: 6.3 G/DL (ref 6.4–8.2)
RBC # BLD AUTO: 4.27 M/UL (ref 4.7–5.5)
SODIUM SERPL-SCNC: 139 MMOL/L (ref 136–145)
WBC # BLD AUTO: 7 K/UL (ref 4.6–13.2)

## 2017-05-26 PROCEDURE — 80053 COMPREHEN METABOLIC PANEL: CPT | Performed by: FAMILY MEDICINE

## 2017-05-26 PROCEDURE — 74011250636 HC RX REV CODE- 250/636: Performed by: SURGERY

## 2017-05-26 PROCEDURE — 85025 COMPLETE CBC W/AUTO DIFF WBC: CPT | Performed by: FAMILY MEDICINE

## 2017-05-26 PROCEDURE — 36415 COLL VENOUS BLD VENIPUNCTURE: CPT | Performed by: FAMILY MEDICINE

## 2017-05-26 PROCEDURE — 77010033678 HC OXYGEN DAILY

## 2017-05-26 RX ORDER — CALCIUM CARBONATE 600 MG
600 TABLET ORAL DAILY
COMMUNITY

## 2017-05-26 RX ORDER — MELATONIN
2000 DAILY
COMMUNITY

## 2017-05-26 RX ORDER — ASCORBIC ACID 500 MG
500 TABLET ORAL DAILY
COMMUNITY

## 2017-05-26 RX ORDER — CARBOXYMETHYLCELLULOSE SODIUM 10 MG/ML
1 GEL OPHTHALMIC AS NEEDED
COMMUNITY

## 2017-05-26 RX ADMIN — DEXTROSE MONOHYDRATE, SODIUM CHLORIDE, AND POTASSIUM CHLORIDE 125 ML/HR: 50; 4.5; 1.49 INJECTION, SOLUTION INTRAVENOUS at 06:55

## 2017-05-26 NOTE — PROGRESS NOTES
7894-Ambulating in room, shuffling gate noted. Asked patient to call for assistance when getting up out of bed for safety. Very pleasant. Denies need for pain medication at this time. Stable. Call light and personal items within reach. Assisted back to bed, resting quietly. Stable. 0422-No change from initial assessment. Bed alarm activated. 0630-Resting in bed, eyes closed, respirations 16, snoring. Shift Summary:  Bed alarm utilized during shift for safety; concerned about gait. Uneventful shift. Rested quietly. Ambulated to bathroom with assistance during shift.

## 2017-05-26 NOTE — DISCHARGE INSTRUCTIONS
Bowel Blockage (Intestinal Obstruction): Care Instructions  Your Care Instructions  A bowel blockage, also called an intestinal obstruction, can prevent gas, fluids, or solids from moving through the intestines normally. It can cause constipation and, rarely, diarrhea. You may have pain, nausea, vomiting, and cramping. Most of the time, complete blockages require a stay in the hospital and possibly surgery. But if your bowel is only partly blocked, your doctor may tell you to wait until it clears on its own and you are able to pass gas and stool. If so, there are things you can do at home to help make you feel better. If you have had surgery for a bowel blockage, there are things you can do at home to make sure you heal well. You can also make some changes to keep your bowel from becoming blocked again. Follow-up care is a key part of your treatment and safety. Be sure to make and go to all appointments, and call your doctor if you are having problems. It's also a good idea to know your test results and keep a list of the medicines you take. How can you care for yourself at home? If your doctor has told you to wait at home for a blockage to clear on its own:  · Follow your doctor's instructions. These may include eating a liquid diet to avoid complete blockage. · Take your medicines exactly as prescribed. Call your doctor if you think you are having a problem with your medicine. · Put a heating pad set on low on your belly to relieve mild cramps and pain. To prevent another blockage  · Try to eat smaller amounts of food more often. For example, have 5 or 6 small meals throughout the day instead of 2 or 3 large meals. · Chew your food very well. Try to chew each bite about 20 times or until it is liquid. · Avoid high-fiber foods and raw fruits and vegetables with skins, husks, strings, or seeds.  These can form a ball of undigested material that can cause a blockage if a part of your bowel is scarred or narrowed. · Check with your doctor before you eat whole-grain products or use a fiber supplement such as Citrucel or Metamucil. · To help you have regular bowel movements, eat at regular times, do not strain during a bowel movement, and drink at least 8 to 10 glasses of water each day. If you have kidney, heart, or liver disease and have to limit fluids, talk with your doctor or before you increase the amount of fluids you drink. · Drink high-calorie liquid formulas if your doctor says to. Severe symptoms may make it hard for your body to take in vitamins and minerals. · Get regular exercise. It helps you digest your food better. Get at least 30 minutes of physical activity on most days of the week. Walking is a good choice. When should you call for help? Call 911 anytime you think you may need emergency care. For example, call if:  · You passed out (lost consciousness). · You have severe pain or swelling in your belly. · You vomit blood or what looks like coffee grounds. · You pass maroon or very bloody stools. · You cannot pass any stools or gas. Call your doctor now or seek immediate medical care if:  · You have a new or higher fever. · You cannot keep fluids or medicines down. · You have new pain that gets worse when you move or cough. · Your symptoms become much worse than usual.  Watch closely for changes in your health, and be sure to contact your doctor if:  · You do not get better as expected. · You have steady diarrhea for more than 2 weeks. · You've been losing weight. Where can you learn more? Go to http://tequila-wong.info/. Enter K541 in the search box to learn more about \"Bowel Blockage (Intestinal Obstruction): Care Instructions. \"  Current as of: August 9, 2016  Content Version: 11.2  © 7023-1211 [employer]. Care instructions adapted under license by [employer] (which disclaims liability or warranty for this information).  If you have questions about a medical condition or this instruction, always ask your healthcare professional. [place] [address] any warranty or liability for your use of this information.

## 2017-05-26 NOTE — ROUTINE PROCESS
Bedside and Verbal shift change report given to Kandi Chatman RN (oncoming nurse) by Mery Jha RN (offgoing nurse). Report included the following information SBAR and Kardex.

## 2017-05-26 NOTE — ROUTINE PROCESS
Bedside and Verbal shift change report given to Chris Byrd RN (oncoming nurse) by Hayley Bryant RN (offgoing nurse). Report included the following information SBAR and Kardex.

## 2017-05-26 NOTE — ROUTINE PROCESS
Verbal shift change report given to Haydee Hauser RN (oncoming nurse) by Franci Ayala   (offgoing nurse). Report included the following information SBAR, Kardex and MAR.

## 2017-05-26 NOTE — PROGRESS NOTES
Interviewed pt on day of discharge to obtain gold standard PAML. Updated EHR accordingly. Notified pt nurse regarding need to update AV discharge summary. Discussed with pt the importance of and strategies they could employ to maintain a current and readily available home medication list .   Addressed pt concerns:   1. Pt using own Xalatan gtts (wife administering) wife concerned about being charged for gtts by hospital.  This pharmacist ensured the hospital charge was reversed. 2.  Concerned he was only receiving one of his two blood pressure pills. I assured pt that his BP was being monitored and assessed multiple times throughout the day. 3.  Concerns that existing home medication list is not correct. This pharmacist reviewed and pt confirmed all home meds in [facility]. Pt anxious to go home and did not want to wait for Dr. Yuniel Weber to be contacted to update the discharge AV summary.   Pt expressed understanding that the medication list on the current discharge summary is not correct and stated that they will bring all their medication bottles (rx and otc) to follow up appt w/ PCP to ensure they also have a current and accurate home medication list.      [facility] Pharmacist  [phone]

## 2017-05-26 NOTE — PROGRESS NOTES
Shift summary: Pt. Up to the bathroom as needed. Worked with PT today. Diet advanced to clear liquids, tolerated well. No complaints of pain or nausea. Wife visited throughout shift, placed her contact number on the board to be reach at any time concerning her . Bed alarm set at bedtime with call light in reach.      Patient Vitals for the past 12 hrs:   Temp Pulse Resp BP SpO2   05/26/17 0001 98 °F (36.7 °C) 83 16 103/58 92 %   05/1934 97.9 °F (36.6 °C) 75 16 116/58 96 %   05/25/17 1808 - - - - 91 %   05/25/17 1743 97.4 °F (36.3 °C) 76 16 130/67 91 %   05/25/17 1344 97.5 °F (36.4 °C) 75 16 121/56 94 %

## 2017-05-26 NOTE — ROUTINE PROCESS
Discharge instructions explained and understood by the patient. IV discontinued, no redness, swelling or pain noted. Pt discharged off the unit via wheelchair with [facility] transport accompanied by wife.

## 2017-06-02 NOTE — DISCHARGE SUMMARY
Discharge Summary    Patient: Macey Holt MRN: [id number]  CSN: [id number]    YOB: 1934  Age: 80 y.o. Sex: male    DOA: 5/24/2017 LOS:  LOS: 2 days   Discharge Date: 5/26/2017     Admission Diagnoses: Partial small bowel obstruction ([facility]  SBO (small bowel obstruction) ([facility]    Discharge Diagnoses:    Problem List as of 5/26/2017  Date Reviewed: 1/5/2014          Codes Class Noted - Resolved    Infectious colitis, enteritis and gastroenteritis ICD-10-CM: A09  ICD-9-CM: 009.0  5/26/2017 - Present        * (Principal)Partial small bowel obstruction ([facility] ICD-10-CM: K56.69  ICD-9-CM: 560.9  5/24/2017 - Present        Dehydration ICD-10-CM: E86.0  ICD-9-CM: 276.51  5/24/2017 - Present        Bradycardia ICD-10-CM: R00.1  ICD-9-CM: 427.89  5/24/2017 - Present        Hyponatremia ICD-10-CM: E87.1  ICD-9-CM: 276.1  5/24/2017 - Present        Essential hypertension (Chronic) ICD-10-CM: I10  ICD-9-CM: 401.9  5/24/2017 - Present        SARAH (obstructive sleep apnea) ICD-10-CM: G47.33  ICD-9-CM: 327.23  1/15/2014 - Present        Chronic airway obstruction, not elsewhere classified ICD-10-CM: J44.9  ICD-9-CM: 140  1/5/2014 - Present        SBO (small bowel obstruction) ([facility] ICD-10-CM: K56.69  ICD-9-CM: 560.9  1/5/2014 - Present        Ileus ([facility] ICD-10-CM: K56.7  ICD-9-CM: 560.1  1/5/2014 - Present        Unspecified pleural effusion ICD-10-CM: J90  ICD-9-CM: 511.9  1/4/2014 - Present        Acquired spondylolisthesis ICD-10-CM: M43.10  ICD-9-CM: 738.4  12/29/2013 - Present    Overview Signed 65/30/7010 01:83 PM by Penny Singer     Patient is scheduled for an L2-L3, L3-L4, L4-L5 posterior spinal decompression/L3-L4, L4-L5 posterior spinal fusion with fixation and transforaminal lumbar interbody fusion with Dr. Nelson Shine and LUIS M Johnson on 12/30/13. Discharge Condition: Good    Hospital Course: Admitted for possible SBO, pSBO and found to have enteritis.   Had small bowel study which showed no obstruction. Consults: Internal Medicine    Significant Diagnostic Studies: Small bowel    Discharge Medications:   Cannot display discharge medications since this patient is not currently admitted.       Activity: Activity as tolerated    Diet: Regular Diet    Wound Care: None needed    Follow-up: none

## 2022-03-18 PROBLEM — A09 INFECTIOUS COLITIS, ENTERITIS AND GASTROENTERITIS: Status: ACTIVE | Noted: 2017-05-26

## 2022-03-19 PROBLEM — R00.1 BRADYCARDIA: Status: ACTIVE | Noted: 2017-05-24

## 2022-03-19 PROBLEM — E86.0 DEHYDRATION: Status: ACTIVE | Noted: 2017-05-24

## 2022-03-19 PROBLEM — E87.1 HYPONATREMIA: Status: ACTIVE | Noted: 2017-05-24

## 2022-03-19 PROBLEM — K56.600 PARTIAL SMALL BOWEL OBSTRUCTION (HCC): Status: ACTIVE | Noted: 2017-05-24

## 2022-03-20 PROBLEM — I10 ESSENTIAL HYPERTENSION: Status: ACTIVE | Noted: 2017-05-24

## 2023-03-24 ENCOUNTER — APPOINTMENT (OUTPATIENT)
Facility: HOSPITAL | Age: 88
DRG: 310 | End: 2023-03-24
Payer: COMMERCIAL

## 2023-03-24 ENCOUNTER — HOSPITAL ENCOUNTER (INPATIENT)
Facility: HOSPITAL | Age: 88
LOS: 5 days | Discharge: NURSING, REHAB OR LONG-TERM CARE | DRG: 310 | End: 2023-03-29
Attending: STUDENT IN AN ORGANIZED HEALTH CARE EDUCATION/TRAINING PROGRAM | Admitting: INTERNAL MEDICINE
Payer: COMMERCIAL

## 2023-03-24 DIAGNOSIS — R47.1 DYSARTHRIA: ICD-10-CM

## 2023-03-24 DIAGNOSIS — R55 SYNCOPE AND COLLAPSE: Primary | ICD-10-CM

## 2023-03-24 DIAGNOSIS — I67.1 BRAIN ANEURYSM: ICD-10-CM

## 2023-03-24 PROBLEM — I10 ESSENTIAL HYPERTENSION: Status: ACTIVE | Noted: 2017-05-24

## 2023-03-24 PROBLEM — R00.1 BRADYCARDIA: Status: ACTIVE | Noted: 2017-05-24

## 2023-03-24 LAB
ALBUMIN SERPL-MCNC: 3.5 G/DL (ref 3.4–5)
ALBUMIN/GLOB SERPL: 1.1 (ref 0.8–1.7)
ALP SERPL-CCNC: 84 U/L (ref 45–117)
ALT SERPL-CCNC: 26 U/L (ref 16–61)
ANION GAP SERPL CALC-SCNC: 5 MMOL/L (ref 3–18)
AST SERPL-CCNC: 21 U/L (ref 10–38)
BASOPHILS # BLD: 0 K/UL (ref 0–0.1)
BASOPHILS NFR BLD: 0 % (ref 0–2)
BILIRUB SERPL-MCNC: 0.5 MG/DL (ref 0.2–1)
BUN SERPL-MCNC: 21 MG/DL (ref 7–18)
BUN/CREAT SERPL: 27 (ref 12–20)
CALCIUM SERPL-MCNC: 9.1 MG/DL (ref 8.5–10.1)
CHLORIDE SERPL-SCNC: 107 MMOL/L (ref 100–111)
CK SERPL-CCNC: 172 U/L (ref 39–308)
CO2 SERPL-SCNC: 29 MMOL/L (ref 21–32)
CREAT SERPL-MCNC: 0.77 MG/DL (ref 0.6–1.3)
DIFFERENTIAL METHOD BLD: ABNORMAL
EOSINOPHIL # BLD: 0.2 K/UL (ref 0–0.4)
EOSINOPHIL NFR BLD: 2 % (ref 0–5)
ERYTHROCYTE [DISTWIDTH] IN BLOOD BY AUTOMATED COUNT: 14 % (ref 11.6–14.5)
GLOBULIN SER CALC-MCNC: 3.2 G/DL (ref 2–4)
GLUCOSE BLD STRIP.AUTO-MCNC: 86 MG/DL (ref 70–110)
GLUCOSE SERPL-MCNC: 102 MG/DL (ref 74–99)
HCT VFR BLD AUTO: 41.5 % (ref 36–48)
HGB BLD-MCNC: 13.8 G/DL (ref 13–16)
IMM GRANULOCYTES # BLD AUTO: 0 K/UL (ref 0–0.04)
IMM GRANULOCYTES NFR BLD AUTO: 0 % (ref 0–0.5)
LYMPHOCYTES # BLD: 1.5 K/UL (ref 0.9–3.6)
LYMPHOCYTES NFR BLD: 15 % (ref 21–52)
MAGNESIUM SERPL-MCNC: 2.1 MG/DL (ref 1.6–2.6)
MCH RBC QN AUTO: 31 PG (ref 24–34)
MCHC RBC AUTO-ENTMCNC: 33.3 G/DL (ref 31–37)
MCV RBC AUTO: 93.3 FL (ref 78–100)
MONOCYTES # BLD: 1 K/UL (ref 0.05–1.2)
MONOCYTES NFR BLD: 10 % (ref 3–10)
NEUTS SEG # BLD: 7.1 K/UL (ref 1.8–8)
NEUTS SEG NFR BLD: 72 % (ref 40–73)
NRBC # BLD: 0 K/UL (ref 0–0.01)
NRBC BLD-RTO: 0 PER 100 WBC
PLATELET # BLD AUTO: 224 K/UL (ref 135–420)
PMV BLD AUTO: 9 FL (ref 9.2–11.8)
POTASSIUM SERPL-SCNC: 3.9 MMOL/L (ref 3.5–5.5)
PROT SERPL-MCNC: 6.7 G/DL (ref 6.4–8.2)
RBC # BLD AUTO: 4.45 M/UL (ref 4.35–5.65)
SODIUM SERPL-SCNC: 141 MMOL/L (ref 136–145)
T4 FREE SERPL-MCNC: 1.3 NG/DL (ref 0.7–1.5)
TROPONIN I SERPL HS-MCNC: 8 NG/L (ref 0–78)
TSH SERPL DL<=0.05 MIU/L-ACNC: 4.05 UIU/ML (ref 0.36–3.74)
WBC # BLD AUTO: 9.9 K/UL (ref 4.6–13.2)

## 2023-03-24 PROCEDURE — 94640 AIRWAY INHALATION TREATMENT: CPT

## 2023-03-24 PROCEDURE — 70450 CT HEAD/BRAIN W/O DYE: CPT

## 2023-03-24 PROCEDURE — 80053 COMPREHEN METABOLIC PANEL: CPT

## 2023-03-24 PROCEDURE — 6370000000 HC RX 637 (ALT 250 FOR IP): Performed by: INTERNAL MEDICINE

## 2023-03-24 PROCEDURE — 1100000003 HC PRIVATE W/ TELEMETRY

## 2023-03-24 PROCEDURE — 82550 ASSAY OF CK (CPK): CPT

## 2023-03-24 PROCEDURE — 2580000003 HC RX 258: Performed by: INTERNAL MEDICINE

## 2023-03-24 PROCEDURE — 82962 GLUCOSE BLOOD TEST: CPT

## 2023-03-24 PROCEDURE — 84443 ASSAY THYROID STIM HORMONE: CPT

## 2023-03-24 PROCEDURE — 6360000002 HC RX W HCPCS: Performed by: INTERNAL MEDICINE

## 2023-03-24 PROCEDURE — 70498 CT ANGIOGRAPHY NECK: CPT

## 2023-03-24 PROCEDURE — 83735 ASSAY OF MAGNESIUM: CPT

## 2023-03-24 PROCEDURE — 71045 X-RAY EXAM CHEST 1 VIEW: CPT

## 2023-03-24 PROCEDURE — 73521 X-RAY EXAM HIPS BI 2 VIEWS: CPT

## 2023-03-24 PROCEDURE — 84484 ASSAY OF TROPONIN QUANT: CPT

## 2023-03-24 PROCEDURE — 99285 EMERGENCY DEPT VISIT HI MDM: CPT

## 2023-03-24 PROCEDURE — 85025 COMPLETE CBC W/AUTO DIFF WBC: CPT

## 2023-03-24 PROCEDURE — 70551 MRI BRAIN STEM W/O DYE: CPT

## 2023-03-24 PROCEDURE — 72125 CT NECK SPINE W/O DYE: CPT

## 2023-03-24 PROCEDURE — 93306 TTE W/DOPPLER COMPLETE: CPT

## 2023-03-24 PROCEDURE — 94762 N-INVAS EAR/PLS OXIMTRY CONT: CPT

## 2023-03-24 PROCEDURE — 6360000004 HC RX CONTRAST MEDICATION: Performed by: STUDENT IN AN ORGANIZED HEALTH CARE EDUCATION/TRAINING PROGRAM

## 2023-03-24 PROCEDURE — 84439 ASSAY OF FREE THYROXINE: CPT

## 2023-03-24 RX ORDER — SODIUM CHLORIDE 9 MG/ML
INJECTION, SOLUTION INTRAVENOUS PRN
Status: DISCONTINUED | OUTPATIENT
Start: 2023-03-24 | End: 2023-03-29 | Stop reason: HOSPADM

## 2023-03-24 RX ORDER — ACETAMINOPHEN 650 MG/1
650 SUPPOSITORY RECTAL EVERY 6 HOURS PRN
Status: DISCONTINUED | OUTPATIENT
Start: 2023-03-24 | End: 2023-03-29 | Stop reason: HOSPADM

## 2023-03-24 RX ORDER — VITAMIN B COMPLEX
2000 TABLET ORAL DAILY
Status: DISCONTINUED | OUTPATIENT
Start: 2023-03-24 | End: 2023-03-29 | Stop reason: HOSPADM

## 2023-03-24 RX ORDER — ONDANSETRON 4 MG/1
4 TABLET, ORALLY DISINTEGRATING ORAL EVERY 8 HOURS PRN
Status: DISCONTINUED | OUTPATIENT
Start: 2023-03-24 | End: 2023-03-29 | Stop reason: HOSPADM

## 2023-03-24 RX ORDER — ONDANSETRON 2 MG/ML
4 INJECTION INTRAMUSCULAR; INTRAVENOUS EVERY 6 HOURS PRN
Status: DISCONTINUED | OUTPATIENT
Start: 2023-03-24 | End: 2023-03-29 | Stop reason: HOSPADM

## 2023-03-24 RX ORDER — LOSARTAN POTASSIUM 25 MG/1
25 TABLET ORAL DAILY
Status: DISCONTINUED | OUTPATIENT
Start: 2023-03-24 | End: 2023-03-27

## 2023-03-24 RX ORDER — SODIUM CHLORIDE 0.9 % (FLUSH) 0.9 %
5-40 SYRINGE (ML) INJECTION PRN
Status: DISCONTINUED | OUTPATIENT
Start: 2023-03-24 | End: 2023-03-29 | Stop reason: HOSPADM

## 2023-03-24 RX ORDER — POLYETHYLENE GLYCOL 3350 17 G/17G
17 POWDER, FOR SOLUTION ORAL DAILY PRN
Status: DISCONTINUED | OUTPATIENT
Start: 2023-03-24 | End: 2023-03-29 | Stop reason: HOSPADM

## 2023-03-24 RX ORDER — LATANOPROST 50 UG/ML
1 SOLUTION/ DROPS OPHTHALMIC NIGHTLY
Status: DISCONTINUED | OUTPATIENT
Start: 2023-03-24 | End: 2023-03-29 | Stop reason: HOSPADM

## 2023-03-24 RX ORDER — LOSARTAN POTASSIUM 25 MG/1
TABLET ORAL
COMMUNITY
Start: 2017-10-24

## 2023-03-24 RX ORDER — LEVOTHYROXINE SODIUM 0.03 MG/1
25 TABLET ORAL DAILY
Status: DISCONTINUED | OUTPATIENT
Start: 2023-03-24 | End: 2023-03-29 | Stop reason: HOSPADM

## 2023-03-24 RX ORDER — ACETAMINOPHEN 325 MG/1
650 TABLET ORAL EVERY 6 HOURS PRN
Status: DISCONTINUED | OUTPATIENT
Start: 2023-03-24 | End: 2023-03-29 | Stop reason: HOSPADM

## 2023-03-24 RX ORDER — IPRATROPIUM BROMIDE AND ALBUTEROL SULFATE 2.5; .5 MG/3ML; MG/3ML
1 SOLUTION RESPIRATORY (INHALATION)
Status: DISCONTINUED | OUTPATIENT
Start: 2023-03-24 | End: 2023-03-27

## 2023-03-24 RX ORDER — SENNA AND DOCUSATE SODIUM 50; 8.6 MG/1; MG/1
1 TABLET, FILM COATED ORAL DAILY
COMMUNITY

## 2023-03-24 RX ORDER — PREDNISOLONE ACETATE 10 MG/ML
1 SUSPENSION/ DROPS OPHTHALMIC 2 TIMES DAILY
Status: ON HOLD | COMMUNITY
Start: 2022-12-09 | End: 2023-03-29 | Stop reason: HOSPADM

## 2023-03-24 RX ORDER — CARVEDILOL 3.12 MG/1
3.12 TABLET ORAL 2 TIMES DAILY
Status: DISCONTINUED | OUTPATIENT
Start: 2023-03-24 | End: 2023-03-24

## 2023-03-24 RX ORDER — SODIUM CHLORIDE 0.9 % (FLUSH) 0.9 %
5-40 SYRINGE (ML) INJECTION EVERY 12 HOURS SCHEDULED
Status: DISCONTINUED | OUTPATIENT
Start: 2023-03-24 | End: 2023-03-29 | Stop reason: HOSPADM

## 2023-03-24 RX ORDER — LATANOPROST 50 UG/ML
SOLUTION/ DROPS OPHTHALMIC
COMMUNITY
Start: 2022-10-10

## 2023-03-24 RX ORDER — ASCORBIC ACID 500 MG
500 TABLET ORAL DAILY
COMMUNITY

## 2023-03-24 RX ORDER — CARVEDILOL 3.12 MG/1
TABLET ORAL
Status: ON HOLD | COMMUNITY
Start: 2017-11-06 | End: 2023-03-29 | Stop reason: HOSPADM

## 2023-03-24 RX ORDER — ENOXAPARIN SODIUM 100 MG/ML
40 INJECTION SUBCUTANEOUS DAILY
Status: DISCONTINUED | OUTPATIENT
Start: 2023-03-24 | End: 2023-03-29 | Stop reason: HOSPADM

## 2023-03-24 RX ADMIN — LEVOTHYROXINE SODIUM 25 MCG: 0.03 TABLET ORAL at 15:10

## 2023-03-24 RX ADMIN — Medication 2000 UNITS: at 15:10

## 2023-03-24 RX ADMIN — SODIUM CHLORIDE, PRESERVATIVE FREE 10 ML: 5 INJECTION INTRAVENOUS at 20:16

## 2023-03-24 RX ADMIN — ENOXAPARIN SODIUM 40 MG: 100 INJECTION SUBCUTANEOUS at 15:31

## 2023-03-24 RX ADMIN — IPRATROPIUM BROMIDE AND ALBUTEROL SULFATE 1 AMPULE: .5; 3 SOLUTION RESPIRATORY (INHALATION) at 20:44

## 2023-03-24 RX ADMIN — LOSARTAN POTASSIUM 25 MG: 25 TABLET, FILM COATED ORAL at 15:10

## 2023-03-24 RX ADMIN — IOPAMIDOL 100 ML: 755 INJECTION, SOLUTION INTRAVENOUS at 09:05

## 2023-03-24 RX ADMIN — LATANOPROST 1 DROP: 50 SOLUTION OPHTHALMIC at 21:15

## 2023-03-24 ASSESSMENT — PAIN SCALES - GENERAL: PAINLEVEL_OUTOF10: 5

## 2023-03-24 ASSESSMENT — PAIN DESCRIPTION - ORIENTATION: ORIENTATION: RIGHT

## 2023-03-24 ASSESSMENT — PAIN DESCRIPTION - DESCRIPTORS: DESCRIPTORS: ACHING

## 2023-03-24 ASSESSMENT — PAIN DESCRIPTION - LOCATION: LOCATION: SHOULDER

## 2023-03-24 NOTE — H&P
prominence of the ventricles and sulci minor changes small vessel disease periventricular white matter. No hemorrhage mass or acute infarction is identified. 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 The bone windows demonstrate no abnormalities. The visualized portions of the paranasal sinuses and mastoid air cells are clear. 1. 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 2. No acute intracranial abnormality is identified. CT CERVICAL SPINE WO CONTRAST    Result Date: 3/24/2023  EXAM:  CT CERVICAL SPINE WITHOUT CONTRAST INDICATION: neck trauma. Reason for exam:->neck trauma COMPARISON: None. CONTRAST:  None. TECHNIQUE: Multislice helical CT of the cervical spine was performed without intravenous contrast administration. Sagittal and coronal reformats were generated. CT dose reduction was achieved through use of a standardized protocol tailored for this examination and automatic exposure control for dose modulation. FINDINGS: The alignment is within normal limits. There is no fracture or compression deformity. The odontoid process is intact. The craniocervical junction is within normal limits. There are emphysematous changes lung apices The incidentally imaged soft tissues are within normal limits. C2-C3: There is no central stenosis. There is facet arthropathy left greater than right. . C3-C4: There is no central stenosis. There is facet arthropathy with narrowing of the neural foramina bilaterally. Hoyt Copping C4-C5: There is no central stenosis. There is facet arthropathy with narrowing of the neural foramina bilaterally. Hoyt Copping C5-C6: There are degenerative disc changes and facet arthropathy with slight central stenosis and slight narrowing of the neural foramina. . C6-C7: There are degenerative disc changes with slight central stenosis and facet arthropathy with narrowing of the neural foramina.  C7-T1: There is

## 2023-03-24 NOTE — ED NOTES
TRANSFER - OUT REPORT:    Verbal report given to Miguel Caro RN on Denzel Bolus  being transferred to [facility] for routine progression of patient care       Report consisted of patient's Situation, Background, Assessment and   Recommendations(SBAR). Information from the following report(s) Nurse Handoff Report and Neuro Assessment was reviewed with the receiving nurse. [place] Assessment: Presents to emergency department  because of falls (Syncope, seizure, or loss of consciousness): Yes, Age > 79: Yes, Altered Mental Status, Intoxication with alcohol or substance confusion (Disorientation, impaired judgment, poor safety awaremess, or inability to follow instructions): No, Impaired Mobility: Ambulates or transfers with assistive devices or assistance; Unable to ambulate or transer.: No, Nursing Judgement: No  Lines:   Peripheral IV 03/24/23 Right Antecubital (Active)   Site Assessment Clean, dry & intact 03/24/23 0745        Opportunity for questions and clarification was provided.       Patient transported with:  [facility], RN  03/24/23 1671

## 2023-03-24 NOTE — ED NOTES
Pt presents via EMS from home for multiple falls preceded by syncope. BG-104, denies blood thinners, new SOB, or cp    On arrival, pt is A&Ox4, follows commands, garbled speech noted. Per pt, his wife stated it sounded 'funny\" to her over past several weeks    MD at bedside for assessment, L forehead hematoma. BG-104.  C/o L hip pain    Known brain aneurysm and scheduled for angiogram     Nagi Hawley RN  03/24/23 0104

## 2023-03-24 NOTE — ED PROVIDER NOTES
2. Brain aneurysm    3. Dysarthria      I Dr. Talisha Benjamin am the primary clinician of record. Dragon Disclaimer     Please note that this dictation was completed with Invisible, the computer voice recognition software. Quite often unanticipated grammatical, syntax, homophones, and other interpretive errors are inadvertently transcribed by the computer software. Please disregard these errors. Please excuse any errors that have escaped final proofreading.     Erica Cain DO  (Electronically signed)            Erica Cain DO  03/24/23 5540

## 2023-03-25 ENCOUNTER — APPOINTMENT (OUTPATIENT)
Facility: HOSPITAL | Age: 88
DRG: 310 | End: 2023-03-25
Payer: COMMERCIAL

## 2023-03-25 LAB
ALBUMIN SERPL-MCNC: 3.3 G/DL (ref 3.4–5)
ALBUMIN/GLOB SERPL: 1.1 (ref 0.8–1.7)
ALP SERPL-CCNC: 79 U/L (ref 45–117)
ALT SERPL-CCNC: 24 U/L (ref 16–61)
ANION GAP SERPL CALC-SCNC: 4 MMOL/L (ref 3–18)
ARTERIAL PATENCY WRIST A: POSITIVE
AST SERPL-CCNC: 21 U/L (ref 10–38)
BASE EXCESS BLD CALC-SCNC: 1.1 MMOL/L
BASOPHILS # BLD: 0 K/UL (ref 0–0.1)
BASOPHILS NFR BLD: 0 % (ref 0–2)
BDY SITE: ABNORMAL
BILIRUB SERPL-MCNC: 0.6 MG/DL (ref 0.2–1)
BUN SERPL-MCNC: 17 MG/DL (ref 7–18)
BUN/CREAT SERPL: 27 (ref 12–20)
CALCIUM SERPL-MCNC: 8.9 MG/DL (ref 8.5–10.1)
CHLORIDE SERPL-SCNC: 107 MMOL/L (ref 100–111)
CO2 SERPL-SCNC: 27 MMOL/L (ref 21–32)
CREAT SERPL-MCNC: 0.62 MG/DL (ref 0.6–1.3)
DIFFERENTIAL METHOD BLD: ABNORMAL
ECHO AO ROOT DIAM: 3.6 CM
ECHO AO ROOT INDEX: 1.73 CM/M2
ECHO AR MAX VEL PISA: 3.8 M/S
ECHO AV AREA PEAK VELOCITY: 2.3 CM2
ECHO AV AREA VTI: 2.3 CM2
ECHO AV AREA/BSA PEAK VELOCITY: 1.1 CM2/M2
ECHO AV AREA/BSA VTI: 1.1 CM2/M2
ECHO AV MEAN GRADIENT: 3 MMHG
ECHO AV MEAN VELOCITY: 0.8 M/S
ECHO AV PEAK GRADIENT: 6 MMHG
ECHO AV PEAK VELOCITY: 1.2 M/S
ECHO AV REGURGITANT PHT: 384.5 MILLISECOND
ECHO AV VELOCITY RATIO: 0.75
ECHO AV VTI: 24.8 CM
ECHO BSA: 2.09 M2
ECHO EST RA PRESSURE: 3 MMHG
ECHO LA DIAMETER INDEX: 1.88 CM/M2
ECHO LA DIAMETER: 3.9 CM
ECHO LA TO AORTIC ROOT RATIO: 1.08
ECHO LA VOL 2C: 28 ML (ref 18–58)
ECHO LA VOL 2C: 30 ML (ref 18–58)
ECHO LA VOL 4C: 27 ML (ref 18–58)
ECHO LA VOL 4C: 34 ML (ref 18–58)
ECHO LA VOL BP: 33 ML (ref 18–58)
ECHO LA VOL/BSA BIPLANE: 16 ML/M2 (ref 16–34)
ECHO LA VOLUME AREA LENGTH: 32 ML
ECHO LA VOLUME INDEX AREA LENGTH: 15 ML/M2 (ref 16–34)
ECHO LV E' LATERAL VELOCITY: 9 CM/S
ECHO LV E' SEPTAL VELOCITY: 4 CM/S
ECHO LV EDV A2C: 97 ML
ECHO LV EDV A4C: 83 ML
ECHO LV EDV BP: 92 ML (ref 67–155)
ECHO LV EDV INDEX A4C: 40 ML/M2
ECHO LV EDV INDEX BP: 44 ML/M2
ECHO LV EDV NDEX A2C: 47 ML/M2
ECHO LV EJECTION FRACTION A2C: 82 %
ECHO LV EJECTION FRACTION A4C: 63 %
ECHO LV EJECTION FRACTION BIPLANE: 75 % (ref 55–100)
ECHO LV ESV A2C: 17 ML
ECHO LV ESV A4C: 31 ML
ECHO LV ESV BP: 23 ML (ref 22–58)
ECHO LV ESV INDEX A2C: 8 ML/M2
ECHO LV ESV INDEX A4C: 15 ML/M2
ECHO LV ESV INDEX BP: 11 ML/M2
ECHO LV FRACTIONAL SHORTENING: 40 % (ref 28–44)
ECHO LV INTERNAL DIMENSION DIASTOLE INDEX: 2.4 CM/M2
ECHO LV INTERNAL DIMENSION DIASTOLIC: 5 CM (ref 4.2–5.9)
ECHO LV INTERNAL DIMENSION SYSTOLIC INDEX: 1.44 CM/M2
ECHO LV INTERNAL DIMENSION SYSTOLIC: 3 CM
ECHO LV IVSD: 1.1 CM (ref 0.6–1)
ECHO LV MASS 2D: 207.1 G (ref 88–224)
ECHO LV MASS INDEX 2D: 99.6 G/M2 (ref 49–115)
ECHO LV POSTERIOR WALL DIASTOLIC: 1.1 CM (ref 0.6–1)
ECHO LV RELATIVE WALL THICKNESS RATIO: 0.44
ECHO LVOT AREA: 3.1 CM2
ECHO LVOT AV VTI INDEX: 0.72
ECHO LVOT DIAM: 2 CM
ECHO LVOT MEAN GRADIENT: 2 MMHG
ECHO LVOT PEAK GRADIENT: 3 MMHG
ECHO LVOT PEAK VELOCITY: 0.9 M/S
ECHO LVOT STROKE VOLUME INDEX: 27 ML/M2
ECHO LVOT SV: 56.2 ML
ECHO LVOT VTI: 17.9 CM
ECHO MV A VELOCITY: 0.94 M/S
ECHO MV E DECELERATION TIME (DT): 111.3 MS
ECHO MV E VELOCITY: 0.64 M/S
ECHO MV E/A RATIO: 0.68
ECHO MV E/E' LATERAL: 7.11
ECHO MV E/E' RATIO (AVERAGED): 11.56
ECHO MV E/E' SEPTAL: 16
ECHO PULMONARY ARTERY SYSTOLIC PRESSURE (PASP): 35 MMHG
ECHO PV MAX VELOCITY: 1.5 M/S
ECHO PV PEAK GRADIENT: 9 MMHG
ECHO RA VOLUME: 39 ML
ECHO RA VOLUME: 41 ML
ECHO RIGHT VENTRICULAR SYSTOLIC PRESSURE (RVSP): 35 MMHG
ECHO RV FREE WALL PEAK S': 18 CM/S
ECHO RV INTERNAL DIMENSION: 3.5 CM
ECHO RV TAPSE: 2.4 CM (ref 1.7–?)
ECHO TV REGURGITANT MAX VELOCITY: 2.83 M/S
ECHO TV REGURGITANT PEAK GRADIENT: 32 MMHG
EOSINOPHIL # BLD: 0.5 K/UL (ref 0–0.4)
EOSINOPHIL NFR BLD: 5 % (ref 0–5)
ERYTHROCYTE [DISTWIDTH] IN BLOOD BY AUTOMATED COUNT: 14.1 % (ref 11.6–14.5)
FOLATE SERPL-MCNC: >20 NG/ML (ref 3.1–17.5)
GAS FLOW.O2 O2 DELIVERY SYS: ABNORMAL
GLOBULIN SER CALC-MCNC: 3 G/DL (ref 2–4)
GLUCOSE SERPL-MCNC: 94 MG/DL (ref 74–99)
HCO3 BLD-SCNC: 25.3 MMOL/L (ref 22–26)
HCT VFR BLD AUTO: 39.6 % (ref 36–48)
HGB BLD-MCNC: 13 G/DL (ref 13–16)
IMM GRANULOCYTES # BLD AUTO: 0 K/UL (ref 0–0.04)
IMM GRANULOCYTES NFR BLD AUTO: 0 % (ref 0–0.5)
LYMPHOCYTES # BLD: 1.8 K/UL (ref 0.9–3.6)
LYMPHOCYTES NFR BLD: 20 % (ref 21–52)
MCH RBC QN AUTO: 30.2 PG (ref 24–34)
MCHC RBC AUTO-ENTMCNC: 32.8 G/DL (ref 31–37)
MCV RBC AUTO: 92.1 FL (ref 78–100)
MONOCYTES # BLD: 0.9 K/UL (ref 0.05–1.2)
MONOCYTES NFR BLD: 10 % (ref 3–10)
NEUTS SEG # BLD: 5.8 K/UL (ref 1.8–8)
NEUTS SEG NFR BLD: 64 % (ref 40–73)
NRBC # BLD: 0 K/UL (ref 0–0.01)
NRBC BLD-RTO: 0 PER 100 WBC
PCO2 BLD: 37.8 MMHG (ref 35–45)
PH BLD: 7.43 (ref 7.35–7.45)
PLATELET # BLD AUTO: 229 K/UL (ref 135–420)
PMV BLD AUTO: 9.2 FL (ref 9.2–11.8)
PO2 BLD: 61 MMHG (ref 80–100)
POTASSIUM SERPL-SCNC: 3.8 MMOL/L (ref 3.5–5.5)
PROT SERPL-MCNC: 6.3 G/DL (ref 6.4–8.2)
RBC # BLD AUTO: 4.3 M/UL (ref 4.35–5.65)
SAO2 % BLD: 92 % (ref 92–97)
SERVICE CMNT-IMP: ABNORMAL
SODIUM SERPL-SCNC: 138 MMOL/L (ref 136–145)
SPECIMEN TYPE: ABNORMAL
TROPONIN I SERPL HS-MCNC: 10 NG/L (ref 0–78)
TROPONIN I SERPL HS-MCNC: 12 NG/L (ref 0–78)
TROPONIN I SERPL HS-MCNC: 9 NG/L (ref 0–78)
VIT B12 SERPL-MCNC: 626 PG/ML (ref 211–911)
WBC # BLD AUTO: 9.1 K/UL (ref 4.6–13.2)

## 2023-03-25 PROCEDURE — 71250 CT THORAX DX C-: CPT

## 2023-03-25 PROCEDURE — 82607 VITAMIN B-12: CPT

## 2023-03-25 PROCEDURE — 6360000002 HC RX W HCPCS: Performed by: INTERNAL MEDICINE

## 2023-03-25 PROCEDURE — 92610 EVALUATE SWALLOWING FUNCTION: CPT

## 2023-03-25 PROCEDURE — 80053 COMPREHEN METABOLIC PANEL: CPT

## 2023-03-25 PROCEDURE — 97162 PT EVAL MOD COMPLEX 30 MIN: CPT

## 2023-03-25 PROCEDURE — 92523 SPEECH SOUND LANG COMPREHEN: CPT

## 2023-03-25 PROCEDURE — 6370000000 HC RX 637 (ALT 250 FOR IP): Performed by: INTERNAL MEDICINE

## 2023-03-25 PROCEDURE — 1100000003 HC PRIVATE W/ TELEMETRY

## 2023-03-25 PROCEDURE — 2580000003 HC RX 258: Performed by: INTERNAL MEDICINE

## 2023-03-25 PROCEDURE — 2700000000 HC OXYGEN THERAPY PER DAY

## 2023-03-25 PROCEDURE — 84484 ASSAY OF TROPONIN QUANT: CPT

## 2023-03-25 PROCEDURE — 97535 SELF CARE MNGMENT TRAINING: CPT

## 2023-03-25 PROCEDURE — 97166 OT EVAL MOD COMPLEX 45 MIN: CPT

## 2023-03-25 PROCEDURE — 82803 BLOOD GASES ANY COMBINATION: CPT

## 2023-03-25 PROCEDURE — 97530 THERAPEUTIC ACTIVITIES: CPT

## 2023-03-25 PROCEDURE — 94640 AIRWAY INHALATION TREATMENT: CPT

## 2023-03-25 PROCEDURE — 36415 COLL VENOUS BLD VENIPUNCTURE: CPT

## 2023-03-25 PROCEDURE — 36600 WITHDRAWAL OF ARTERIAL BLOOD: CPT

## 2023-03-25 PROCEDURE — 85025 COMPLETE CBC W/AUTO DIFF WBC: CPT

## 2023-03-25 RX ADMIN — IPRATROPIUM BROMIDE AND ALBUTEROL SULFATE 1 AMPULE: .5; 3 SOLUTION RESPIRATORY (INHALATION) at 15:02

## 2023-03-25 RX ADMIN — SODIUM CHLORIDE, PRESERVATIVE FREE 10 ML: 5 INJECTION INTRAVENOUS at 08:13

## 2023-03-25 RX ADMIN — ACETAMINOPHEN 650 MG: 325 TABLET ORAL at 20:18

## 2023-03-25 RX ADMIN — ENOXAPARIN SODIUM 40 MG: 100 INJECTION SUBCUTANEOUS at 08:12

## 2023-03-25 RX ADMIN — ACETAMINOPHEN 650 MG: 325 TABLET ORAL at 03:54

## 2023-03-25 RX ADMIN — IPRATROPIUM BROMIDE AND ALBUTEROL SULFATE 1 AMPULE: .5; 3 SOLUTION RESPIRATORY (INHALATION) at 11:27

## 2023-03-25 RX ADMIN — IPRATROPIUM BROMIDE AND ALBUTEROL SULFATE 1 AMPULE: .5; 3 SOLUTION RESPIRATORY (INHALATION) at 20:35

## 2023-03-25 RX ADMIN — LEVOTHYROXINE SODIUM 25 MCG: 0.03 TABLET ORAL at 06:27

## 2023-03-25 RX ADMIN — LOSARTAN POTASSIUM 25 MG: 25 TABLET, FILM COATED ORAL at 08:12

## 2023-03-25 RX ADMIN — Medication 2000 UNITS: at 08:12

## 2023-03-25 RX ADMIN — IPRATROPIUM BROMIDE AND ALBUTEROL SULFATE 1 AMPULE: .5; 3 SOLUTION RESPIRATORY (INHALATION) at 07:17

## 2023-03-25 ASSESSMENT — PAIN DESCRIPTION - ORIENTATION: ORIENTATION: RIGHT

## 2023-03-25 ASSESSMENT — PAIN DESCRIPTION - DESCRIPTORS: DESCRIPTORS: ACHING

## 2023-03-25 ASSESSMENT — PAIN DESCRIPTION - LOCATION: LOCATION: SHOULDER

## 2023-03-25 ASSESSMENT — PAIN SCALES - GENERAL
PAINLEVEL_OUTOF10: 6
PAINLEVEL_OUTOF10: 5

## 2023-03-26 LAB
ALBUMIN SERPL-MCNC: 3.1 G/DL (ref 3.4–5)
ALBUMIN/GLOB SERPL: 1.1 (ref 0.8–1.7)
ALP SERPL-CCNC: 75 U/L (ref 45–117)
ALT SERPL-CCNC: 25 U/L (ref 16–61)
ANION GAP SERPL CALC-SCNC: 5 MMOL/L (ref 3–18)
AST SERPL-CCNC: 22 U/L (ref 10–38)
BASOPHILS # BLD: 0 K/UL (ref 0–0.1)
BASOPHILS NFR BLD: 0 % (ref 0–2)
BILIRUB SERPL-MCNC: 0.5 MG/DL (ref 0.2–1)
BUN SERPL-MCNC: 16 MG/DL (ref 7–18)
BUN/CREAT SERPL: 23 (ref 12–20)
CALCIUM SERPL-MCNC: 8.6 MG/DL (ref 8.5–10.1)
CHLORIDE SERPL-SCNC: 107 MMOL/L (ref 100–111)
CO2 SERPL-SCNC: 27 MMOL/L (ref 21–32)
CREAT SERPL-MCNC: 0.71 MG/DL (ref 0.6–1.3)
DIFFERENTIAL METHOD BLD: ABNORMAL
EOSINOPHIL # BLD: 0.7 K/UL (ref 0–0.4)
EOSINOPHIL NFR BLD: 7 % (ref 0–5)
ERYTHROCYTE [DISTWIDTH] IN BLOOD BY AUTOMATED COUNT: 14.4 % (ref 11.6–14.5)
GLOBULIN SER CALC-MCNC: 2.8 G/DL (ref 2–4)
GLUCOSE SERPL-MCNC: 90 MG/DL (ref 74–99)
HCT VFR BLD AUTO: 38.8 % (ref 36–48)
HGB BLD-MCNC: 12.6 G/DL (ref 13–16)
IMM GRANULOCYTES # BLD AUTO: 0 K/UL (ref 0–0.04)
IMM GRANULOCYTES NFR BLD AUTO: 0 % (ref 0–0.5)
LYMPHOCYTES # BLD: 1.7 K/UL (ref 0.9–3.6)
LYMPHOCYTES NFR BLD: 18 % (ref 21–52)
MCH RBC QN AUTO: 30.7 PG (ref 24–34)
MCHC RBC AUTO-ENTMCNC: 32.5 G/DL (ref 31–37)
MCV RBC AUTO: 94.4 FL (ref 78–100)
MONOCYTES # BLD: 1 K/UL (ref 0.05–1.2)
MONOCYTES NFR BLD: 10 % (ref 3–10)
NEUTS SEG # BLD: 6.1 K/UL (ref 1.8–8)
NEUTS SEG NFR BLD: 65 % (ref 40–73)
NRBC # BLD: 0 K/UL (ref 0–0.01)
NRBC BLD-RTO: 0 PER 100 WBC
PLATELET # BLD AUTO: 216 K/UL (ref 135–420)
PMV BLD AUTO: 9.4 FL (ref 9.2–11.8)
POTASSIUM SERPL-SCNC: 3.6 MMOL/L (ref 3.5–5.5)
PROT SERPL-MCNC: 5.9 G/DL (ref 6.4–8.2)
RBC # BLD AUTO: 4.11 M/UL (ref 4.35–5.65)
SODIUM SERPL-SCNC: 139 MMOL/L (ref 136–145)
TROPONIN I SERPL HS-MCNC: 10 NG/L (ref 0–78)
TROPONIN I SERPL HS-MCNC: 11 NG/L (ref 0–78)
TROPONIN I SERPL HS-MCNC: 12 NG/L (ref 0–78)
WBC # BLD AUTO: 9.5 K/UL (ref 4.6–13.2)

## 2023-03-26 PROCEDURE — 80053 COMPREHEN METABOLIC PANEL: CPT

## 2023-03-26 PROCEDURE — 2580000003 HC RX 258: Performed by: INTERNAL MEDICINE

## 2023-03-26 PROCEDURE — 6360000002 HC RX W HCPCS: Performed by: INTERNAL MEDICINE

## 2023-03-26 PROCEDURE — 36415 COLL VENOUS BLD VENIPUNCTURE: CPT

## 2023-03-26 PROCEDURE — 1100000003 HC PRIVATE W/ TELEMETRY

## 2023-03-26 PROCEDURE — 6370000000 HC RX 637 (ALT 250 FOR IP): Performed by: INTERNAL MEDICINE

## 2023-03-26 PROCEDURE — 97530 THERAPEUTIC ACTIVITIES: CPT

## 2023-03-26 PROCEDURE — 2700000000 HC OXYGEN THERAPY PER DAY

## 2023-03-26 PROCEDURE — 94640 AIRWAY INHALATION TREATMENT: CPT

## 2023-03-26 PROCEDURE — 84484 ASSAY OF TROPONIN QUANT: CPT

## 2023-03-26 PROCEDURE — 97116 GAIT TRAINING THERAPY: CPT

## 2023-03-26 PROCEDURE — 85025 COMPLETE CBC W/AUTO DIFF WBC: CPT

## 2023-03-26 RX ADMIN — IPRATROPIUM BROMIDE AND ALBUTEROL SULFATE 1 AMPULE: .5; 3 SOLUTION RESPIRATORY (INHALATION) at 20:56

## 2023-03-26 RX ADMIN — ENOXAPARIN SODIUM 40 MG: 100 INJECTION SUBCUTANEOUS at 10:01

## 2023-03-26 RX ADMIN — ACETAMINOPHEN 650 MG: 325 TABLET ORAL at 20:28

## 2023-03-26 RX ADMIN — LATANOPROST 1 DROP: 50 SOLUTION OPHTHALMIC at 20:35

## 2023-03-26 RX ADMIN — IPRATROPIUM BROMIDE AND ALBUTEROL SULFATE 1 AMPULE: .5; 3 SOLUTION RESPIRATORY (INHALATION) at 11:53

## 2023-03-26 RX ADMIN — IPRATROPIUM BROMIDE AND ALBUTEROL SULFATE 1 AMPULE: .5; 3 SOLUTION RESPIRATORY (INHALATION) at 08:15

## 2023-03-26 RX ADMIN — LOSARTAN POTASSIUM 25 MG: 25 TABLET, FILM COATED ORAL at 10:01

## 2023-03-26 RX ADMIN — SODIUM CHLORIDE, PRESERVATIVE FREE 10 ML: 5 INJECTION INTRAVENOUS at 20:35

## 2023-03-26 RX ADMIN — LEVOTHYROXINE SODIUM 25 MCG: 0.03 TABLET ORAL at 06:38

## 2023-03-26 RX ADMIN — POLYETHYLENE GLYCOL 3350 17 G: 17 POWDER, FOR SOLUTION ORAL at 10:01

## 2023-03-26 RX ADMIN — IPRATROPIUM BROMIDE AND ALBUTEROL SULFATE 1 AMPULE: .5; 3 SOLUTION RESPIRATORY (INHALATION) at 14:54

## 2023-03-26 RX ADMIN — SODIUM CHLORIDE, PRESERVATIVE FREE 10 ML: 5 INJECTION INTRAVENOUS at 10:01

## 2023-03-26 RX ADMIN — Medication 2000 UNITS: at 10:01

## 2023-03-26 ASSESSMENT — PAIN SCALES - GENERAL
PAINLEVEL_OUTOF10: 0
PAINLEVEL_OUTOF10: 5

## 2023-03-26 ASSESSMENT — PAIN SCALES - WONG BAKER: WONGBAKER_NUMERICALRESPONSE: 0

## 2023-03-26 NOTE — CONSULTS
telemetry monitor  Patient was on carvedilol but due to possibility of bradycardia / hypotension it was discontinued.     Will follow        Signed By: Jose Levi MD     March 25, 2023
CT imaging of the chest, abdomen and pelvis was performed without contrast.  Coronal and sagittal reconstructions were obtained. CT dose reduction was achieved through use of a standardized protocol tailored for this examination and automatic exposure control for dose modulation. FINDINGS: THYROID: Unremarkable. MEDIASTINUM/ELIOT: No mass or lymphadenopathy. Dilated ascending thoracic aorta 4.5 cm HEART/PERICARDIUM: Unremarkable. Coronary artery calcification:  2 present LUNGS/PLEURA: Emphysema and chronic interstitial opacities. No pulmonary edema, pleural effusion, or pneumothorax. BONES: No destructive bone lesion. ADDITIONAL COMMENTS:  N/A LIVER: No mass or biliary dilatation. GALLBLADDER: Unremarkable. SPLEEN: No enlargement or lesion. PANCREAS: No mass or ductal dilatation. ADRENALS: No mass. KIDNEYS: No mass, calculus, or hydronephrosis. GI TRACT:  Moderate amount of colonic stool. No bowel obstruction PERITONEUM: No free air or free fluid. APPENDIX: Unremarkable. Sherryle Moder RETROPERITONEUM: Atherosclerosis of the tortuous abdominal aorta without aneurysm. LYMPH NODES:  None enlarged. ADDITIONAL COMMENTS: N/A. URINARY BLADDER: No mass or calculus. LYMPH NODES:  None enlarged. REPRODUCTIVE ORGANS: Unremarkable. FREE FLUID:  None. BONES: Postoperative and degenerative changes lumbar spine. ADDITIONAL COMMENTS: N/A. No acute process. MRI BRAIN WO CONTRAST    Result Date: 3/24/2023  EXAM: MRI BRAIN WO CONTRAST INDICATION: Stroke COMPARISON: None. CONTRAST: None. TECHNIQUE:  Multiplanar multisequence acquisition without contrast of the brain. FINDINGS: Diffusion imaging does not show acute ischemic changes. There is no extra-axial fluid collection, hemorrhage or shift. Flow voids in major vessels at the base of the brain are present. Minimal atrophy and nonspecific white matter changes. No mass. 1. No acute findings no mass. 2. Minimal atrophy and white matter disease.     ASSESSMENT/IMPRESSION:  Syncopal

## 2023-03-27 LAB
ALBUMIN SERPL-MCNC: 3.4 G/DL (ref 3.4–5)
ALBUMIN/GLOB SERPL: 1.1 (ref 0.8–1.7)
ALP SERPL-CCNC: 82 U/L (ref 45–117)
ALT SERPL-CCNC: 25 U/L (ref 16–61)
ANION GAP SERPL CALC-SCNC: 5 MMOL/L (ref 3–18)
AST SERPL-CCNC: 21 U/L (ref 10–38)
BASOPHILS # BLD: 0 K/UL (ref 0–0.1)
BASOPHILS NFR BLD: 0 % (ref 0–2)
BILIRUB SERPL-MCNC: 0.6 MG/DL (ref 0.2–1)
BUN SERPL-MCNC: 14 MG/DL (ref 7–18)
BUN/CREAT SERPL: 21 (ref 12–20)
CALCIUM SERPL-MCNC: 9 MG/DL (ref 8.5–10.1)
CHLORIDE SERPL-SCNC: 105 MMOL/L (ref 100–111)
CO2 SERPL-SCNC: 27 MMOL/L (ref 21–32)
CREAT SERPL-MCNC: 0.68 MG/DL (ref 0.6–1.3)
DIFFERENTIAL METHOD BLD: ABNORMAL
EKG ATRIAL RATE: 88 BPM
EKG DIAGNOSIS: NORMAL
EKG P AXIS: 42 DEGREES
EKG P-R INTERVAL: 194 MS
EKG Q-T INTERVAL: 394 MS
EKG QRS DURATION: 142 MS
EKG QTC CALCULATION (BAZETT): 476 MS
EKG R AXIS: 63 DEGREES
EKG T AXIS: -1 DEGREES
EKG VENTRICULAR RATE: 88 BPM
EOSINOPHIL # BLD: 0.5 K/UL (ref 0–0.4)
EOSINOPHIL NFR BLD: 6 % (ref 0–5)
ERYTHROCYTE [DISTWIDTH] IN BLOOD BY AUTOMATED COUNT: 14.4 % (ref 11.6–14.5)
GLOBULIN SER CALC-MCNC: 3 G/DL (ref 2–4)
GLUCOSE SERPL-MCNC: 96 MG/DL (ref 74–99)
HCT VFR BLD AUTO: 40.1 % (ref 36–48)
HGB BLD-MCNC: 12.8 G/DL (ref 13–16)
IMM GRANULOCYTES # BLD AUTO: 0 K/UL (ref 0–0.04)
IMM GRANULOCYTES NFR BLD AUTO: 0 % (ref 0–0.5)
LYMPHOCYTES # BLD: 1.5 K/UL (ref 0.9–3.6)
LYMPHOCYTES NFR BLD: 15 % (ref 21–52)
MCH RBC QN AUTO: 30.1 PG (ref 24–34)
MCHC RBC AUTO-ENTMCNC: 31.9 G/DL (ref 31–37)
MCV RBC AUTO: 94.4 FL (ref 78–100)
MONOCYTES # BLD: 0.9 K/UL (ref 0.05–1.2)
MONOCYTES NFR BLD: 9 % (ref 3–10)
NEUTS SEG # BLD: 6.6 K/UL (ref 1.8–8)
NEUTS SEG NFR BLD: 70 % (ref 40–73)
NRBC # BLD: 0 K/UL (ref 0–0.01)
NRBC BLD-RTO: 0 PER 100 WBC
PLATELET # BLD AUTO: 223 K/UL (ref 135–420)
PMV BLD AUTO: 9 FL (ref 9.2–11.8)
POTASSIUM SERPL-SCNC: 3.8 MMOL/L (ref 3.5–5.5)
PROT SERPL-MCNC: 6.4 G/DL (ref 6.4–8.2)
RBC # BLD AUTO: 4.25 M/UL (ref 4.35–5.65)
SODIUM SERPL-SCNC: 137 MMOL/L (ref 136–145)
TROPONIN I SERPL HS-MCNC: 11 NG/L (ref 0–78)
TROPONIN I SERPL HS-MCNC: 11 NG/L (ref 0–78)
WBC # BLD AUTO: 9.5 K/UL (ref 4.6–13.2)

## 2023-03-27 PROCEDURE — 97116 GAIT TRAINING THERAPY: CPT

## 2023-03-27 PROCEDURE — 80053 COMPREHEN METABOLIC PANEL: CPT

## 2023-03-27 PROCEDURE — 36415 COLL VENOUS BLD VENIPUNCTURE: CPT

## 2023-03-27 PROCEDURE — 6370000000 HC RX 637 (ALT 250 FOR IP): Performed by: INTERNAL MEDICINE

## 2023-03-27 PROCEDURE — 85025 COMPLETE CBC W/AUTO DIFF WBC: CPT

## 2023-03-27 PROCEDURE — 97530 THERAPEUTIC ACTIVITIES: CPT

## 2023-03-27 PROCEDURE — 84484 ASSAY OF TROPONIN QUANT: CPT

## 2023-03-27 PROCEDURE — 6370000000 HC RX 637 (ALT 250 FOR IP): Performed by: HOSPITALIST

## 2023-03-27 PROCEDURE — 94640 AIRWAY INHALATION TREATMENT: CPT

## 2023-03-27 PROCEDURE — 1100000003 HC PRIVATE W/ TELEMETRY

## 2023-03-27 PROCEDURE — 2700000000 HC OXYGEN THERAPY PER DAY

## 2023-03-27 PROCEDURE — 6360000002 HC RX W HCPCS: Performed by: INTERNAL MEDICINE

## 2023-03-27 PROCEDURE — 2580000003 HC RX 258: Performed by: INTERNAL MEDICINE

## 2023-03-27 PROCEDURE — 92526 ORAL FUNCTION THERAPY: CPT

## 2023-03-27 RX ORDER — POLYETHYLENE GLYCOL 3350 17 G/17G
17 POWDER, FOR SOLUTION ORAL 2 TIMES DAILY
Status: DISCONTINUED | OUTPATIENT
Start: 2023-03-27 | End: 2023-03-29 | Stop reason: HOSPADM

## 2023-03-27 RX ORDER — LOSARTAN POTASSIUM 50 MG/1
50 TABLET ORAL DAILY
Status: DISCONTINUED | OUTPATIENT
Start: 2023-03-28 | End: 2023-03-29 | Stop reason: HOSPADM

## 2023-03-27 RX ORDER — SODIUM PHOSPHATE, DIBASIC AND SODIUM PHOSPHATE, MONOBASIC 7; 19 G/133ML; G/133ML
1 ENEMA RECTAL
Status: DISPENSED | OUTPATIENT
Start: 2023-03-27 | End: 2023-03-28

## 2023-03-27 RX ORDER — IPRATROPIUM BROMIDE AND ALBUTEROL SULFATE 2.5; .5 MG/3ML; MG/3ML
1 SOLUTION RESPIRATORY (INHALATION) EVERY 4 HOURS PRN
Status: DISCONTINUED | OUTPATIENT
Start: 2023-03-27 | End: 2023-03-29 | Stop reason: HOSPADM

## 2023-03-27 RX ADMIN — LATANOPROST 1 DROP: 50 SOLUTION OPHTHALMIC at 23:16

## 2023-03-27 RX ADMIN — ENOXAPARIN SODIUM 40 MG: 100 INJECTION SUBCUTANEOUS at 10:23

## 2023-03-27 RX ADMIN — ACETAMINOPHEN 650 MG: 325 TABLET ORAL at 23:21

## 2023-03-27 RX ADMIN — SODIUM CHLORIDE, PRESERVATIVE FREE 10 ML: 5 INJECTION INTRAVENOUS at 23:26

## 2023-03-27 RX ADMIN — LEVOTHYROXINE SODIUM 25 MCG: 0.03 TABLET ORAL at 10:23

## 2023-03-27 RX ADMIN — LOSARTAN POTASSIUM 25 MG: 25 TABLET, FILM COATED ORAL at 10:23

## 2023-03-27 RX ADMIN — IPRATROPIUM BROMIDE AND ALBUTEROL SULFATE 1 AMPULE: .5; 3 SOLUTION RESPIRATORY (INHALATION) at 11:38

## 2023-03-27 RX ADMIN — SODIUM CHLORIDE, PRESERVATIVE FREE 10 ML: 5 INJECTION INTRAVENOUS at 10:24

## 2023-03-27 RX ADMIN — Medication 2000 UNITS: at 10:23

## 2023-03-27 RX ADMIN — IPRATROPIUM BROMIDE AND ALBUTEROL SULFATE 1 AMPULE: .5; 3 SOLUTION RESPIRATORY (INHALATION) at 08:21

## 2023-03-27 ASSESSMENT — PAIN SCALES - GENERAL
PAINLEVEL_OUTOF10: 0
PAINLEVEL_OUTOF10: 5
PAINLEVEL_OUTOF10: 0

## 2023-03-27 ASSESSMENT — PAIN - FUNCTIONAL ASSESSMENT: PAIN_FUNCTIONAL_ASSESSMENT: ACTIVITIES ARE NOT PREVENTED

## 2023-03-27 ASSESSMENT — PAIN DESCRIPTION - DESCRIPTORS: DESCRIPTORS: ACHING

## 2023-03-27 ASSESSMENT — PAIN DESCRIPTION - LOCATION: LOCATION: SHOULDER

## 2023-03-27 ASSESSMENT — PAIN DESCRIPTION - ORIENTATION: ORIENTATION: RIGHT

## 2023-03-27 NOTE — WOUND CARE
Per PT,there are no identified home PT needs. I notified  Pt.'s attending.  
Norman Dupree 
 Wound Care Note:    Chart audited for low sharon score, patient with high risk for skin breakdown, no documented wounds at time of audit.      Skin Care & Pressure Relief Recommendations  Minimize layers of linen  Pads under patient to optimize support surface  Turn/reposition approximately every 2 hours  Pillow wedges  Manage incontinence   Promote continence; Skin Protective lotion/cream to buttocks and sacrum daily and as needed with incontinence care  Offload heels pillows    Consult wound care if any wounds/ skin care needs noted during admission

## 2023-03-28 LAB
SARS-COV-2 RDRP RESP QL NAA+PROBE: NOT DETECTED
SOURCE: NORMAL

## 2023-03-28 PROCEDURE — 97530 THERAPEUTIC ACTIVITIES: CPT

## 2023-03-28 PROCEDURE — 92507 TX SP LANG VOICE COMM INDIV: CPT

## 2023-03-28 PROCEDURE — 6370000000 HC RX 637 (ALT 250 FOR IP): Performed by: HOSPITALIST

## 2023-03-28 PROCEDURE — 97116 GAIT TRAINING THERAPY: CPT

## 2023-03-28 PROCEDURE — 6360000002 HC RX W HCPCS: Performed by: INTERNAL MEDICINE

## 2023-03-28 PROCEDURE — 6370000000 HC RX 637 (ALT 250 FOR IP): Performed by: INTERNAL MEDICINE

## 2023-03-28 PROCEDURE — 1100000003 HC PRIVATE W/ TELEMETRY

## 2023-03-28 PROCEDURE — 2700000000 HC OXYGEN THERAPY PER DAY

## 2023-03-28 PROCEDURE — 87635 SARS-COV-2 COVID-19 AMP PRB: CPT

## 2023-03-28 PROCEDURE — 2580000003 HC RX 258: Performed by: INTERNAL MEDICINE

## 2023-03-28 RX ORDER — SENNA AND DOCUSATE SODIUM 50; 8.6 MG/1; MG/1
2 TABLET, FILM COATED ORAL DAILY
Status: DISCONTINUED | OUTPATIENT
Start: 2023-03-28 | End: 2023-03-29 | Stop reason: HOSPADM

## 2023-03-28 RX ORDER — SENNA AND DOCUSATE SODIUM 50; 8.6 MG/1; MG/1
2 TABLET, FILM COATED ORAL DAILY
Status: DISCONTINUED | OUTPATIENT
Start: 2023-03-28 | End: 2023-03-28

## 2023-03-28 RX ADMIN — LATANOPROST 1 DROP: 50 SOLUTION OPHTHALMIC at 20:21

## 2023-03-28 RX ADMIN — SODIUM CHLORIDE, PRESERVATIVE FREE 10 ML: 5 INJECTION INTRAVENOUS at 09:46

## 2023-03-28 RX ADMIN — Medication 2000 UNITS: at 09:44

## 2023-03-28 RX ADMIN — LOSARTAN POTASSIUM 50 MG: 50 TABLET, FILM COATED ORAL at 09:45

## 2023-03-28 RX ADMIN — SODIUM CHLORIDE, PRESERVATIVE FREE 10 ML: 5 INJECTION INTRAVENOUS at 20:22

## 2023-03-28 RX ADMIN — POLYETHYLENE GLYCOL 3350 17 G: 17 POWDER, FOR SOLUTION ORAL at 09:44

## 2023-03-28 RX ADMIN — ENOXAPARIN SODIUM 40 MG: 100 INJECTION SUBCUTANEOUS at 09:30

## 2023-03-28 RX ADMIN — LEVOTHYROXINE SODIUM 25 MCG: 0.03 TABLET ORAL at 08:00

## 2023-03-28 ASSESSMENT — PAIN SCALES - GENERAL
PAINLEVEL_OUTOF10: 0
PAINLEVEL_OUTOF10: 0

## 2023-03-29 VITALS
SYSTOLIC BLOOD PRESSURE: 120 MMHG | BODY MASS INDEX: 26.49 KG/M2 | RESPIRATION RATE: 20 BRPM | HEART RATE: 95 BPM | HEIGHT: 72 IN | OXYGEN SATURATION: 93 % | WEIGHT: 195.6 LBS | TEMPERATURE: 97.5 F | DIASTOLIC BLOOD PRESSURE: 72 MMHG

## 2023-03-29 PROCEDURE — 92526 ORAL FUNCTION THERAPY: CPT

## 2023-03-29 PROCEDURE — 6370000000 HC RX 637 (ALT 250 FOR IP): Performed by: INTERNAL MEDICINE

## 2023-03-29 PROCEDURE — 6370000000 HC RX 637 (ALT 250 FOR IP): Performed by: HOSPITALIST

## 2023-03-29 PROCEDURE — 6360000002 HC RX W HCPCS: Performed by: INTERNAL MEDICINE

## 2023-03-29 RX ORDER — LEVOTHYROXINE SODIUM 0.03 MG/1
25 TABLET ORAL DAILY
Qty: 30 TABLET | Refills: 0
Start: 2023-03-30

## 2023-03-29 RX ADMIN — ENOXAPARIN SODIUM 40 MG: 100 INJECTION SUBCUTANEOUS at 08:31

## 2023-03-29 RX ADMIN — LOSARTAN POTASSIUM 50 MG: 50 TABLET, FILM COATED ORAL at 08:31

## 2023-03-29 RX ADMIN — Medication 2000 UNITS: at 08:31

## 2023-03-29 RX ADMIN — LEVOTHYROXINE SODIUM 25 MCG: 0.03 TABLET ORAL at 06:42

## 2023-03-29 RX ADMIN — SENNOSIDES AND DOCUSATE SODIUM 2 TABLET: 50; 8.6 TABLET ORAL at 08:31

## 2023-03-29 NOTE — PLAN OF CARE
Problem: Discharge Planning  Goal: Discharge to home or other facility with appropriate resources  3/24/2023 1620 by Angel Rodriguez RN  Outcome: Progressing  Flowsheets (Taken 3/24/2023 1500)  Discharge to home or other facility with appropriate resources: Identify barriers to discharge with patient and caregiver     Problem: Pain  Goal: Verbalizes/displays adequate comfort level or baseline comfort level  3/24/2023 1620 by Angel Rodriguez RN  Outcome: Progressing     Problem: ABCDS Injury Assessment  Goal: Absence of physical injury  3/24/2023 1620 by Angel Rodriguez RN  Outcome: Progressing     Problem: Safety - Adult  Goal: Free from fall injury  3/24/2023 2154 by Linda Mae RN  Outcome: Progressing  3/24/2023 1620 by Angel Rodriguez RN  Outcome: Progressing
Problem: Discharge Planning  Goal: Discharge to home or other facility with appropriate resources  3/28/2023 0241 by Shanna Larson RN  Outcome: Progressing  3/27/2023 2017 by Gloria Malone RN  Outcome: Progressing  Flowsheets  Taken 3/27/2023 2000 by Shanna Larson RN  Discharge to home or other facility with appropriate resources: Identify barriers to discharge with patient and caregiver  Taken 3/27/2023 1023 by Gloria Malone RN  Discharge to home or other facility with appropriate resources: Identify barriers to discharge with patient and caregiver     Problem: Pain  Goal: Verbalizes/displays adequate comfort level or baseline comfort level  3/28/2023 0241 by Shanna Larson RN  Outcome: Progressing  3/27/2023 2017 by Gloria Malone RN  Outcome: Progressing     Problem: ABCDS Injury Assessment  Goal: Absence of physical injury  3/28/2023 0241 by Shanna Larson RN  Outcome: Progressing  3/27/2023 2017 by Gloria Malone RN  Outcome: Progressing     Problem: Safety - Adult  Goal: Free from fall injury  3/28/2023 0241 by Shanna Larson RN  Outcome: Progressing  3/27/2023 2017 by Gloria Malone RN  Outcome: Progressing     Problem: Skin/Tissue Integrity  Goal: Absence of new skin breakdown  Description: 1. Monitor for areas of redness and/or skin breakdown  2. Assess vascular access sites hourly  3. Every 4-6 hours minimum:  Change oxygen saturation probe site  4. Every 4-6 hours:  If on nasal continuous positive airway pressure, respiratory therapy assess nares and determine need for appliance change or resting period.   3/28/2023 0241 by Shanna Larson RN  Outcome: Progressing  3/27/2023 2017 by Gloria Malone RN  Outcome: Progressing
Problem: Discharge Planning  Goal: Discharge to home or other facility with appropriate resources  3/28/2023 2216 by Guzman Gray RN  Outcome: Progressing  3/28/2023 1807 by Delsa Sandifer, RN  Outcome: Progressing     Problem: Pain  Goal: Verbalizes/displays adequate comfort level or baseline comfort level  3/28/2023 2216 by Guzman Gray RN  Outcome: Progressing  3/28/2023 1807 by Delsa Sandifer, RN  Outcome: Progressing     Problem: ABCDS Injury Assessment  Goal: Absence of physical injury  3/28/2023 2216 by Guzman Gary RN  Outcome: Progressing  3/28/2023 1807 by Delsa Sandifer, RN  Outcome: Progressing     Problem: Safety - Adult  Goal: Free from fall injury  3/28/2023 2216 by Guzman Gray RN  Outcome: Progressing  3/28/2023 1807 by Delsa Sandifer, RN  Outcome: Progressing     Problem: Skin/Tissue Integrity  Goal: Absence of new skin breakdown  Description: 1. Monitor for areas of redness and/or skin breakdown  2. Assess vascular access sites hourly  3. Every 4-6 hours minimum:  Change oxygen saturation probe site  4. Every 4-6 hours:  If on nasal continuous positive airway pressure, respiratory therapy assess nares and determine need for appliance change or resting period.   3/28/2023 2216 by Guzman Gray RN  Outcome: Progressing  3/28/2023 1807 by Delsa Sandifer, RN  Outcome: Progressing
Problem: Discharge Planning  Goal: Discharge to home or other facility with appropriate resources  3/29/2023 0814 by Zuly Tyler RN  Outcome: Adequate for Discharge  3/28/2023 2216 by Skyler Engle RN  Outcome: Progressing     Problem: Pain  Goal: Verbalizes/displays adequate comfort level or baseline comfort level  3/29/2023 0814 by Zuly Tyler RN  Outcome: Adequate for Discharge  3/28/2023 2216 by Skyler Engle RN  Outcome: Progressing     Problem: ABCDS Injury Assessment  Goal: Absence of physical injury  3/29/2023 0814 by Zuly Tyler RN  Outcome: Adequate for Discharge  3/28/2023 2216 by Skyler Engle RN  Outcome: Progressing     Problem: Safety - Adult  Goal: Free from fall injury  3/29/2023 0814 by Zuly Tyler RN  Outcome: Adequate for Discharge  3/28/2023 2216 by Skyler Engle RN  Outcome: Progressing     Problem: Skin/Tissue Integrity  Goal: Absence of new skin breakdown  Description: 1. Monitor for areas of redness and/or skin breakdown  2. Assess vascular access sites hourly  3. Every 4-6 hours minimum:  Change oxygen saturation probe site  4. Every 4-6 hours:  If on nasal continuous positive airway pressure, respiratory therapy assess nares and determine need for appliance change or resting period.   3/29/2023 0814 by Zuly Tyler RN  Outcome: Adequate for Discharge  3/28/2023 2216 by Skyler Engle RN  Outcome: Progressing
Problem: Discharge Planning  Goal: Discharge to home or other facility with appropriate resources  Outcome: Progressing  Flowsheets (Taken 3/25/2023 0845)  Discharge to home or other facility with appropriate resources: Identify barriers to discharge with patient and caregiver     Problem: Pain  Goal: Verbalizes/displays adequate comfort level or baseline comfort level  Outcome: Progressing     Problem: ABCDS Injury Assessment  Goal: Absence of physical injury  Outcome: Progressing     Problem: Safety - Adult  Goal: Free from fall injury  3/25/2023 0910 by Lexus Rashid RN  Outcome: Progressing  3/24/2023 2154 by Annette Cavanaugh RN  Outcome: Progressing     Problem: Skin/Tissue Integrity  Goal: Absence of new skin breakdown  Description: 1. Monitor for areas of redness and/or skin breakdown  2. Assess vascular access sites hourly  3. Every 4-6 hours minimum:  Change oxygen saturation probe site  4. Every 4-6 hours:  If on nasal continuous positive airway pressure, respiratory therapy assess nares and determine need for appliance change or resting period.   Outcome: Progressing
Problem: Discharge Planning  Goal: Discharge to home or other facility with appropriate resources  Outcome: Progressing  Flowsheets (Taken 3/26/2023 0815)  Discharge to home or other facility with appropriate resources: Identify barriers to discharge with patient and caregiver     Problem: Pain  Goal: Verbalizes/displays adequate comfort level or baseline comfort level  Outcome: Progressing     Problem: ABCDS Injury Assessment  Goal: Absence of physical injury  Outcome: Progressing     Problem: Safety - Adult  Goal: Free from fall injury  Outcome: Progressing     Problem: Skin/Tissue Integrity  Goal: Absence of new skin breakdown  Description: 1. Monitor for areas of redness and/or skin breakdown  2. Assess vascular access sites hourly  3. Every 4-6 hours minimum:  Change oxygen saturation probe site  4. Every 4-6 hours:  If on nasal continuous positive airway pressure, respiratory therapy assess nares and determine need for appliance change or resting period.   Outcome: Progressing
The patient is progressing toward the following goals.        Problem: Discharge Planning  Goal: Discharge to home or other facility with appropriate resources  Outcome: Progressing  Flowsheets (Taken 3/24/2023 1500)  Discharge to home or other facility with appropriate resources: Identify barriers to discharge with patient and caregiver     Problem: Pain  Goal: Verbalizes/displays adequate comfort level or baseline comfort level  Outcome: Progressing     Problem: ABCDS Injury Assessment  Goal: Absence of physical injury  Outcome: Progressing     Problem: Safety - Adult  Goal: Free from fall injury  Outcome: Progressing
The patient is progressing toward the following goals. Problem: Discharge Planning  Goal: Discharge to home or other facility with appropriate resources  Outcome: Progressing  Flowsheets (Taken 3/27/2023 1023)  Discharge to home or other facility with appropriate resources: Identify barriers to discharge with patient and caregiver     Problem: Pain  Goal: Verbalizes/displays adequate comfort level or baseline comfort level  Outcome: Progressing     Problem: ABCDS Injury Assessment  Goal: Absence of physical injury  Outcome: Progressing     Problem: Safety - Adult  Goal: Free from fall injury  Outcome: Progressing     Problem: Skin/Tissue Integrity  Goal: Absence of new skin breakdown  Description: 1. Monitor for areas of redness and/or skin breakdown  2. Assess vascular access sites hourly  3. Every 4-6 hours minimum:  Change oxygen saturation probe site  4. Every 4-6 hours:  If on nasal continuous positive airway pressure, respiratory therapy assess nares and determine need for appliance change or resting period.   Outcome: Progressing
Motor Speech:  Oral/Motor  Gag: No Impairment          Voice: WFL       SPEECH THERAPY DIAGNOSIS:  dysarthria    The severity rating is based on the following outcomes:    Clinical judgment     PAIN:  Pt reports 0 /10 pain or discomfort prior to evaluation. Pt reports 0/10 pain or discomfort post evaluation. After treatment:   [] Patient left in no apparent distress sitting up in chair  [x] Patient left in no apparent distress in bed  [x] Call bell left within reach  [x]  Nursing notified  []  Caregiver present  []  Bed alarm activated    COMMUNICATION/EDUCATION:   [] Patient educated regarding compensatory speech/language/comprehension techniques  provided via demonstration, verbalization and teach back of comprehension  [x] Patient/family have participated as able in goal setting and plan of care. [x] Patient/family agree to work toward stated goals and plan of care. [] Patient understands intent and goals of therapy, neutral about participation. [] Patient unable to participate in goal setting/plan of care secondary to cognition, hearing/vision deficits; education ongoing with interdisciplinary staff     Thank you for this referral.  Dennis Nascimento.  Flavia RUSSO CCC-SLP

## 2023-03-29 NOTE — DISCHARGE SUMMARY
his rehab stay which will likely be in a couple of weeks if I were to make an estimate. He is stable for release from the hospital today. I discussed the plan of care and the summary of his test results with his wife on 03/27, and he will need followup as an outpatient which hopefully we can set up here for his carotid artery aneurysm. It will need to be [facility] as the closest location. We will see if Case Management can set that up prior to his release to the facility. Results from CTA head/neck:    1. No evidence of large vessel occlusion or significant flow-limiting stenosis. 2.  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 3.  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 4.  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 5.  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 Forty minutes discharge time.       Tonia Christy MD      [id number]  D:  03/29/2023 9:31  T:  03/29/2023 10:28  JOB #:  [id number]  CC:  Heather Shepherd MD

## 2023-03-29 NOTE — PROGRESS NOTES
0730: report given to this nurse from Karen Justin RN    1210: OT at the bedside  Michael Franklin RN    [phone]: PT at the bedside  Michael Franklin RN
0730: report given to this nurse from [place] RN    1000: report given to [facility] travel 3Gigi Ryder RN
1925 Bedside and Verbal shift change  Received from MALLY Resendiz (outgoing nurse), to DEANN Newsome (incoming)  Pt. Is AOX 3. IV SL, Pt. denies pain at this time. Report included the following information SBAR, Procedure Summary, Intake/Output, MAR, Recent Results, Med Rec Status, and Cardiac Rhythm @ SR. Will Resume care and monitor Pt. Condition. 2000 Pt. Head to Toe Assessment Done and documented. Pt. Educated on call bell when in need of help and assistance. Pt. verbalized understanding. 2115  Pt. Given incontinent care, back care. Changed brief and pads. Pt. Able to turn with assist.    2220  Pt. Resting in bed, not in distress. 2300  Pt attempt oob, Bed alarm activated. Pt. Assisted back to bed, incontinent care given/changed brief and pads. Bed alarm on.     0000  Pt. Resting in bed, not in distress. 0045  Pt. Attempted to OOB, Pt. Assisted back to bed. Bed alarm on. 0230 Pt resting in bed comfortably. 0410  Pt made no complaints. 0530  Pt given incontinent care/jaime care changed brief pads and gown. 1044  Verbal and bedside Shift changed report given to MALLY Tran (oncoming RN) on Pt. Condition. Report consisted of patients Situation, History, Activities, intake/output,  Background, Assessment and Recommendations(SBAR). Information from the following report(s) Kardex, order Summary, Lab results and MAR was reviewed with the receiving nurse. Opportunity for questions and clarification was provided.
COVID test sent as protocol for possible discharge to outside facility. COVID NEGATIVE.
Cardiology Progress Note        Patient: Jagdish Pedersen        Sex: male          DOA: 3/24/2023  YOB: 1934      Age:  80 y.o.        LOS:  LOS: 2 days      Patient seen and examined, chart reviewed    Assessment/Plan     Patient Active Problem List   Diagnosis    Infectious colitis, enteritis and gastroenteritis    SBO (small bowel obstruction) (HCC)    Hyponatremia    Ileus ([address]    DANILO (obstructive sleep apnea)    Dehydration    Bradycardia    Partial small bowel obstruction (HCC)    Acquired spondylolisthesis    Essential hypertension    Syncope and collapse    Brain aneurysm      Echocardiogram revealed       Left Ventricle: Normal left ventricular systolic function with a visually estimated EF of 60 - 65%. Left ventricle size is normal. Mildly increased wall thickness. Findings consistent with mild concentric hypertrophy. Normal wall motion. Grade I diastolic dysfunction present with normal LV EF. Aortic Valve: Not well visualized. Trileaflet valve. Mildly thickened cusp. Mildly calcified cusp. Mild regurgitation. No stenosis. Tricuspid Valve: Not well visualized. Mild regurgitation. The estimated PASP is 35 mmHg. Telemetry monitor reviewed. No significant arrhythmias noted      CT chest/abdomen reported No acute process. CTA head and neck reported     1. No evidence of large vessel occlusion or significant flow-limiting stenosis. 2.  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 3.  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 4.  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 5.  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 Telemetry monitor reviewed no significant arrhythmias noted in last 24 hours     Plan:      Syncope appears arrhythmic in origin. Continue telemetry monitor  Patient was on carvedilol but due to possibility of bradycardia / hypotension it was discontinued.     Will follow            Subjective:    cc:
Case Management Assessment  Initial Evaluation    Date/Time of Evaluation: 3/25/2023 4:30 PM  Assessment Completed by: River Watts RN    If patient is discharged prior to next notation, then this note serves as note for discharge by case management. Patient Name: Melissa Ramirez                   YOB: 1934  Diagnosis: Syncope and collapse [R55]  Dysarthria [R47.1]  Brain aneurysm [I67.1]                   Date / Time: 3/24/2023  7:42 AM    Patient Admission Status: Inpatient   Readmission Risk (Low < 19, Mod (19-27), High > 27): Readmission Risk Score: 9.1    Current PCP: Darylene Marks, MD  PCP verified by CM? Yes    Chart Reviewed: Yes; per H&P, patient is a \"80 y.o.  male who has hx of copd, home oxygen 2l at night, sleep apnea, HTN presents with recurrent syncope over the last 2 weeks  Patient states that today while getting out of bed he fell and landing on the floor. He waited 5 minutes after transitioning from lying to sitting but still ended on the floor; He has had this happen about 3-4 times this last 2 weeks. \"    History Provided by: Patient  Patient Orientation: Alert and Oriented (slightly garbled speech pattern)    Patient Cognition: Alert    Hospitalization in the last 30 days (Readmission):  No    If yes, Readmission Assessment in CM Navigator will be completed. Advance Directives:      Code Status: Full Code   Patient's Primary Decision Maker is:        Discharge Planning:    Patient lives with: Spouse/Significant Other Type of Home: House  Primary Care Giver: Self  Patient Support Systems include: Spouse/Significant Other, Children   Current Financial resources: Medicare  Current community resources:    Current services prior to admission: C-pap, Oxygen Therapy (2L at night; [employer] is DME vendor)            Current DME:              Type of Home Care services:  None    ADLS  Prior functional level:    Current functional level:  Independent in ADLs/IADLs    PT AM-PAC:
DC: [facility] @ [phone]  via transport. Phone: [phone]    DC summary to be posted. Spouse aware of DC and agreeable with plan.
Hospitalist Progress Note    Patient: Errol Adkins MRN: [id number]  CSN: [id number]    YOB: 1934  Age: 80 y.o. Sex: male    DOA: 3/24/2023 LOS:  LOS: 3 days          Chief Complaint:    syncope      Assessment/Plan     Syncope  MRI brain was negative  cerebral aneurysm on CTA, seen by neurology  To see neurosurg as outpatient  Neuro has seen here     Bradycardia-coreg stopped    HTN-inc dose of cozaar    Weakness-for SNF on discharge    COPD  Cont inhaler prn  Home oxygen on 2liters at night     Hx of Thoracic aneurysm   Check CT chest stable 4.5 cm aneurysm    Sleep apnea  Cpap at night      Glaucoma cont home drops       Disposition :SNF 24 hrs, auth pending  Active Hospital Problems    Diagnosis     Syncope and collapse [R55]     Brain aneurysm [I67.1]     Essential hypertension [I10]     Bradycardia [R00.1]        Subjective:  I feel ok  Do I need to go somewhere? Denies new problems        Review of systems:    Constitutional: denies fevers  Respiratory: denies SOB,  Cardiovascular: denies chest pain, palpitations  Gastrointestinal: denies nausea, vomiting, diarrhea      Vital signs/Intake and Output:  Visit Vitals  /76   Pulse 55   Temp 98.5 °F (36.9 °C) (Oral)   Resp 18   Ht 6' (1.829 m)   Wt 202 lb 8 oz (91.9 kg)   SpO2 96%   BMI 27.46 kg/m²     Current Shift:  No intake/output data recorded.   Last three shifts:  03/25 1901 - 03/27 0700  In: 240 [P.O.:240]  Out: 900 [Urine:900]    Exam:    General:elderly WM NAD pleasant  CVS:Regular rate and rhythm, no M/R/G, S1/S2 heard, no thrill  Lungs:Clear to auscultation bilaterally, no wheezes, rhonchi, or rales  Abdomen: Soft, Nontender, No distention, Normal Bowel sounds  Extremities: No C/C/E, pulses palpable 2+  Neuro:grossly normal , follows commands  Psych:appropriate    Labs: Results:       Chemistry Recent Labs     03/25/23  0329 03/26/23  0341 03/27/23  0230   GLUCOSE 94 90 96    139 137   K 3.8 3.6 3.8    107 105   CO2 27
Hospitalist Progress Note    Patient: Sangeeta Cain MRN: [id number]  CSN: [id number]    YOB: 1934  Age: 80 y.o. Sex: male    DOA: 3/24/2023 LOS:  LOS: 4 days                Assessment/Plan     Active Hospital Problems    Diagnosis     Syncope and collapse [R55]     Brain aneurysm [I67.1]     Essential hypertension [I10]     Bradycardia [R00.1]         Chief complaint :  Syncope    Syncope  MRI brain was negative  cerebral aneurysm on CTA, seen by neurology  To see neurosurg as outpatient  B12, folate in normal range    Progressive dementia     Bradycardia-  coreg stopped     HTN-  on cozaar     Weakness-  for SNF on discharge     COPD  Cont inhaler prn  Home oxygen on 2liters at night     Hx of Thoracic aneurysm   Check CT chest stable 4.5 cm aneurysm     Sleep apnea  Cpap at night      Glaucoma cont home drops    Called and discussed with wife. Answered all questions. Disposition : await SNF    Review of systems  General: No fevers or chills. Cardiovascular: No chest pain or pressure. No palpitations. Pulmonary: No shortness of breath. Gastrointestinal: No nausea, vomiting. Physical Exam:  General: Awake, cooperative, no acute distress    HEENT: NC, Atraumatic. PERRLA, anicteric sclerae. Lungs: CTA Bilaterally. No Wheezing/Rhonchi/Rales. Heart:  S1 S2,  No murmur, No Rubs, No Gallops  Abdomen: Soft, Non distended, Non tender. +Bowel sounds,   Extremities: No c/c/e  Psych:   Not anxious or agitated. Neurologic:  No acute neurological deficit. Vital signs/Intake and Output:  Visit Vitals  BP (!) 154/77   Pulse 63   Temp 97.6 °F (36.4 °C) (Oral)   Resp 18   Ht 6' (1.829 m)   Wt 195 lb 9.6 oz (88.7 kg)   SpO2 96%   BMI 26.53 kg/m²     Current Shift:  No intake/output data recorded.   Last three shifts:  03/27 0701 - 03/28 1900  In: 100 [P.O.:100]  Out: -             Labs: Results:       Chemistry Recent Labs     03/26/23  0341 03/27/23  0230    137   K 3.6 3.8    105
MARIZOL spoke with [place] regarding bed options. Facility does have beds and will review case and start auth if approved. SW to follow.
Physical Therapy    1156: Pt asleep, snoring heavily, will follow up as schedule permits. Scheduled to TF to SNF at 15:00.    1312: Pt is awake and is now eating lunch.
Physician Progress Note      Rica George  CSN #:                  [id number]  :                       1934  ADMIT DATE:       3/24/2023 7:42 AM  DISCH DATE:  RESPONDING  PROVIDER #:        Hayes Fitch MD          QUERY TEXT:    Patient admitted with syncope. Cardiology notes \"Syncope appears arrhythmic in   origin. \" Neurology notes \"Syncopal episodes. The episodes may well be   cardiogenic or orthostatic in nature. There is possibility of transient   hypoxia, resulting in syncope as well. \" If possible, please document in   progress notes and discharge summary after study the etiology of the syncope: The medical record reflects the following:  Risk Factors: Hx COPD; sleep apnea  Clinical Indicators:  Card consult-Syncope appears arrhythmic in origin. 3/26  Neurology note-Syncopal episodes. The episodes may well be cardiogenic   or orthostatic in nature. There is possibility of transient hypoxia, resulting   in syncope as well. Neurology signs off at this time. Treatment: Neurology and cardiology consults; MRI brain; inhaler PRN; home O2   2L at night; hold on coreg; orthostatics ordered    Thank you,  Mohini Sanchez [email or web address]  Options provided:  -- Syncope due to bradycardia  -- Syncope due to transient hypoxia  -- Syncope due to orthostasis  -- Syncope due to (please specify), please specify. -- Other - I will add my own diagnosis  -- Disagree - Not applicable / Not valid  -- Disagree - Clinically unable to determine / Unknown  -- Refer to Clinical Documentation Reviewer    PROVIDER RESPONSE TEXT:    The syncope is due to bradycardia.     Query created by: Josh Oates on 3/27/2023 11:25 AM      Electronically signed by:  Hayes Fitch MD 3/27/2023 12:26 PM
[facility] approved for SNF, [facility] approved as well. Patient to transfer when stable. Wife to provided C-Pap to SNF upon DC. COVID test needed prior to DC. SW to follow.
Received report from [address] [employer] [address]. Pt AAOx3, NAD, breathing non labored, on O2 NC at 2L, HOB up. IV site clean, dry and intact. Bed at the lowest level on lock position, call bell w/i reach. Bed alarm on. Bed alarm went off several times through the night. Pt wanted to have a bowel movement. Assisted pt w/ the [facility] but no BM. Incontinent care  done for urine.
SW spoke with patient regarding DC planning needs. Patient agreeable to SNF upon DC. SW called and spoke with spouse as well. [facility] choice, [facility] second and lastly [facility]. Patient has [employer] and will require auth prior to DC. SW reached out to admissions at the facilities for review. Spouse stated that she \"only wants him there for a short time\". SW to follow to assist with DC planning needs.
Transfer report given to receiving nurse [facility] for questions and clarification given  Transport medics currently on unit picking patient up.
Transport not able to  until [address] will not approve late transport. Transport rescheduled 03/29 @10am. SW called and updated wife. Wife agreeable with plan.
Home/family situation and support systems  [x]             Safety awareness  []             Pain tolerance/management  []             Other:      PLAN :  Recommendations and Planned Interventions:   Strengthening;ROM;Balance training;Functional mobility training;Self-Care / ADL    Frequency/Duration: Patient will be followed by occupational therapy to address goals, 1-2 times per day/3-5 days per week to address goals. Further Equipment Recommendations for Discharge: [place]    Discharge Recommendation: Discharge Recommendations: Subacute/Skilled Nursing Facility;Home with Home health OT    [facility]: 15/24    This [facility] score should be considered in conjunction with interdisciplinary team recommendations to determine the most appropriate discharge setting. Patient's social support, diagnosis, medical stability, and prior level of function should also be taken into consideration. 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   Barriers to Learning/Limitations: none  Compensate with: visual, verbal, tactile, kinesthetic cues/model    Home Situation:   Social/Functional History  Lives With: Spouse  Type of Home: House  Home Layout: Two level  Home Access: Stairs to enter with rails  Entrance Stairs - Number of Steps: 4; BHR; 18 steps to second level and BHR  Bathroom Shower/Tub: Walk-in shower, Shower chair with back  [employer]: Grab bars in shower  Home Equipment: Rollator, Walker, rolling  Has the patient had two or more falls in the past year or any fall with injury in the past year?: Yes  Receives Help From: Family, Friend(s)  ADL Assistance: Independent  Homemaking Responsibilities: No  Ambulation Assistance: Needs assistance  Transfer Assistance: Needs
intelligibility with strategy use at discourse level judged to be 85%. Dysphagia:  Patient tolerated water + straw with no overt s/sx of aspiration. Recommend continue regular diet and thin liquids, with meds per patient preference. Response & Tolerance to Activities:  Exercise Tolerance/Response: Tolerated well    PAIN:  Start of Tx: 0  End of Tx: 0     After treatment:   []       Patient left in no apparent distress sitting up in chair  [x]       Patient left in no apparent distress in bed  []       Call bell left within reach  []       Nursing notified  []       Caregiver present  []       Bed alarm activated      COMMUNICATION/EDUCATION:   [x] Patient educated regarding compensatory speech/language/comprehension techniques provided via demonstration, verbalization and teach back of comprehension  [] Patient/family have participated as able in goal setting and plan of care. [] Patient/family agree to work toward stated goals and plan of care. [] Patient understands intent and goals of therapy, neutral about participation.   [] Patient unable to participate in goal setting/plan of care secondary to cognition, hearing/vision deficits; education ongoing with interdisciplinary staff       April Guadalupe [address], [id number] [facility]
03/26/2023 03:41 AM    BUN 16 03/26/2023 03:41 AM    CREATININE 0.71 03/26/2023 03:41 AM    GLUCOSE 90 03/26/2023 03:41 AM    CALCIUM 8.6 03/26/2023 03:41 AM      Last 3 BMP:   Recent Labs     03/24/23  0744 03/25/23  0329 03/26/23  0341    138 139   K 3.9 3.8 3.6    107 107   CO2 29 27 27   BUN 21* 17 16   CREATININE 0.77 0.62 0.71   GLUCOSE 102* 94 90   CALCIUM 9.1 8.9 8.6      CMP:   Lab Results   Component Value Date/Time     03/26/2023 03:41 AM    K 3.6 03/26/2023 03:41 AM     03/26/2023 03:41 AM    CO2 27 03/26/2023 03:41 AM    BUN 16 03/26/2023 03:41 AM    CREATININE 0.71 03/26/2023 03:41 AM    GLUCOSE 90 03/26/2023 03:41 AM    CALCIUM 8.6 03/26/2023 03:41 AM    PROT 5.9 03/26/2023 03:41 AM    LABALBU 3.1 03/26/2023 03:41 AM    BILITOT 0.5 03/26/2023 03:41 AM    AST 22 03/26/2023 03:41 AM    ALT 25 03/26/2023 03:41 AM        Last 3 CMP:   Recent Labs     03/24/23  0744 03/25/23  0329 03/26/23  0341    138 139   K 3.9 3.8 3.6    107 107   CO2 29 27 27   BUN 21* 17 16   CREATININE 0.77 0.62 0.71   GLUCOSE 102* 94 90   CALCIUM 9.1 8.9 8.6   PROT 6.7 6.3* 5.9*   LABALBU 3.5 3.3* 3.1*   ALKPHOS 84 79 75   AST 21 21 22   ALT 26 24 25        Glucose:   Lab Results   Component Value Date/Time    GLUCOSE 90 03/26/2023 03:41 AM        Last 3 Glucose:   Recent Labs     03/24/23  0744 03/25/23  0329 03/26/23  0341   GLUCOSE 102* 94 90        POC Glucose:   Lab Results   Component Value Date/Time    POCGLU 86 03/24/2023 07:47 AM        Last 3 POC Glucose:   Recent Labs     03/24/23  0747   POCGLU 86        Last 3 CK, CKMB, Troponin:   Recent Labs     03/24/23  0744   CKTOTAL 172        CBC:   Lab Results   Component Value Date/Time    WBC 9.5 03/26/2023 03:41 AM    RBC 4.11 03/26/2023 03:41 AM    HGB 12.6 03/26/2023 03:41 AM    HCT 38.8 03/26/2023 03:41 AM    MCV 94.4 03/26/2023 03:41 AM    MCH 30.7 03/26/2023 03:41 AM    MCHC 32.5 03/26/2023 03:41 AM    RDW 14.4 03/26/2023 03:41 AM
Last 3 BMP:   Recent Labs     03/24/23  0744 03/25/23  0329    138   K 3.9 3.8    107   CO2 29 27   BUN 21* 17   CREATININE 0.77 0.62   GLUCOSE 102* 94   CALCIUM 9.1 8.9    CMP:   Lab Results   Component Value Date/Time     03/25/2023 03:29 AM    K 3.8 03/25/2023 03:29 AM     03/25/2023 03:29 AM    CO2 27 03/25/2023 03:29 AM    BUN 17 03/25/2023 03:29 AM    CREATININE 0.62 03/25/2023 03:29 AM    GLUCOSE 94 03/25/2023 03:29 AM    CALCIUM 8.9 03/25/2023 03:29 AM    PROT 6.3 03/25/2023 03:29 AM    LABALBU 3.3 03/25/2023 03:29 AM    BILITOT 0.6 03/25/2023 03:29 AM    AST 21 03/25/2023 03:29 AM    ALT 24 03/25/2023 03:29 AM      Last 3 CMP:   Recent Labs     03/24/23  0744 03/25/23  0329    138   K 3.9 3.8    107   CO2 29 27   BUN 21* 17   CREATININE 0.77 0.62   GLUCOSE 102* 94   CALCIUM 9.1 8.9   PROT 6.7 6.3*   LABALBU 3.5 3.3*   ALKPHOS 84 79   AST 21 21   ALT 26 24      Glucose:   Lab Results   Component Value Date/Time    GLUCOSE 94 03/25/2023 03:29 AM      Last 3 Glucose:   Recent Labs     03/24/23  0744 03/25/23  0329   GLUCOSE 102* 94      POC Glucose:   Lab Results   Component Value Date/Time    POCGLU 86 03/24/2023 07:47 AM      Last 3 POC Glucose:   Recent Labs     03/24/23  0747   POCGLU 86      Last 3 CK, CKMB, Troponin:   Recent Labs     03/24/23  0744   CKTOTAL 172      CBC:   Lab Results   Component Value Date/Time    WBC 9.1 03/25/2023 03:29 AM    RBC 4.30 03/25/2023 03:29 AM    HGB 13.0 03/25/2023 03:29 AM    HCT 39.6 03/25/2023 03:29 AM    MCV 92.1 03/25/2023 03:29 AM    MCH 30.2 03/25/2023 03:29 AM    MCHC 32.8 03/25/2023 03:29 AM    RDW 14.1 03/25/2023 03:29 AM     03/25/2023 03:29 AM    MPV 9.2 03/25/2023 03:29 AM      Last 3 CBC:   Recent Labs     03/24/23  0744 03/25/23  0329   WBC 9.9 9.1   RBC 4.45 4.30*   HGB 13.8 13.0   HCT 41.5 39.6   MCV 93.3 92.1   MCH 31.0 30.2   MCHC 33.3 32.8   RDW 14.0 14.1    229   MPV 9.0* 9.2      WBC:   Lab Results
Spouse  Type of Home: House  Home Layout: Two level, Able to Live on Main level with bedroom/bathroom  Home Access: Stairs to enter with rails  Entrance Stairs - Number of Steps: 4; BHR; 18 steps to second level and BHR  Entrance Stairs - Rails: Both  Bathroom Shower/Tub: Walk-in shower, Shower chair with back  [employer]: Grab bars in shower  Home Equipment: Rollator, Walker, rolling  Has the patient had two or more falls in the past year or any fall with injury in the past year?: Yes  Receives Help From: Family, Friend(s)  ADL Assistance: Independent  Homemaking Responsibilities: No  Ambulation Assistance: Needs assistance  Transfer Assistance: Needs assistance  Additional Comments: Pt lives with spouse and reports amb independently w/o AD until last 2 months when pt began using RW. Has had 3 falls in last few months.   Critical Behavior:  Orientation Level: Oriented X4  WFL  Strength:    Strength: Generally decreased, functional  Tone & Sensation:   Tone: Normal  Sensation: Intact  Range Of Motion:  AROM: Generally decreased, functional  Functional Mobility:  Bed Mobility:  Bed Mobility Training  Bed Mobility Training: Yes  Supine to Sit: Contact-guard assistance;Stand-by assistance  Sit to Supine: Contact-guard assistance;Stand-by assistance  Scooting: Contact-guard assistance  Transfers:  Transfer Training  Transfer Training: Yes  Overall Level of Assistance: Contact-guard assistance  Interventions: Verbal cues  Sit to Stand: Contact-guard assistance  Stand to Sit: Contact-guard assistance  Balance:   Balance  Sitting: Intact  Sitting - Static: Good (unsupported)  Sitting - Dynamic: Good (unsupported)  Standing: Impaired  Standing - Static: Fair  Standing - Dynamic: Fair  Ambulation/Gait Training:  Gait Training  Gait Training: Yes  Gait  Overall Level of Assistance: Contact-guard assistance;Minimum assistance  Interventions: Verbal cues  Base of Support: Widened  Speed/Jes: Slow  Step Length: Right
acetaminophen (TYLENOL) tablet 650 mg  650 mg Oral Q6H PRN    Or    acetaminophen (TYLENOL) suppository 650 mg  650 mg Rectal Q6H PRN               Lab/Data Reviewed:       Recent Labs     03/25/23  0329 03/26/23  0341 03/27/23  0230   WBC 9.1 9.5 9.5   HGB 13.0 12.6* 12.8*   HCT 39.6 38.8 40.1    216 223       Recent Labs     03/25/23  0329 03/26/23  0341 03/27/23  0230    139 137   K 3.8 3.6 3.8    107 105   CO2 27 27 27   BUN 17 16 14         Signed By: Maggie Garza MD     March 27, 2023
applied      COMMUNICATION/EDUCATION:          Alli TierneyYARI  Minutes: [address] AM-PAC® Basic Mobility Inpatient Short Form (6-Clicks) Version 2    How much HELP from another person does the patient currently need    (If the patient hasn't done an activity recently, how much help from another person do you think he/she would need if he/she tried?)   Total (Total A or Dep)   A Lot  (Mod to Max A)   A Little (Sup or Min A)   None (Mod I to I)   Turning from your back to your side while in a flat bed without using bedrails? [] 1 [] 2 [x] 3 [] 4   2. Moving from lying on your back to sitting on the side of a flat bed without using bedrails? [] 1 [] 2 [x] 3 [] 4   3. Moving to and from a bed to a chair (including a wheelchair)? [] 1 [] 2 [x] 3 [] 4   4. Standing up from a chair using your arms (e.g., wheelchair, or bedside chair)? [] 1 [] 2 [x] 3 [] 4   5. Walking in hospital room? [] 1 [] 2 [x] 3 [] 4   6. Climbing 3-5 steps with a railing?+   [] 1 [] 2 [] 3 [] 4   +If stair climbing cannot be assessed, skip item #6. Sum responses from items 1-5. Current research shows that an AM-PAC score of 17 (13 without stairs) or less is not associated with a discharge to the patient's home setting. Based on an AM-PAC score of 15/24 (15/20 if omitting stairs) and their current functional mobility deficits, it is recommended that the patient have 3-5 sessions per week of Physical Therapy at d/c to increase the patient's independence.
applied      COMMUNICATION/EDUCATION:          Sue Loving YARI  Minutes: [id number] AM-PAC® Basic Mobility Inpatient Short Form (6-Clicks) Version 2    How much HELP from another person does the patient currently need    (If the patient hasn't done an activity recently, how much help from another person do you think he/she would need if he/she tried?)   Total (Total A or Dep)   A Lot  (Mod to Max A)   A Little (Sup or Min A)   None (Mod I to I)   Turning from your back to your side while in a flat bed without using bedrails? [] 1 [] 2 [x] 3 [] 4   2. Moving from lying on your back to sitting on the side of a flat bed without using bedrails? [] 1 [] 2 [x] 3 [] 4   3. Moving to and from a bed to a chair (including a wheelchair)? [] 1 [] 2 [x] 3 [] 4   4. Standing up from a chair using your arms (e.g., wheelchair, or bedside chair)? [] 1 [] 2 [x] 3 [] 4   5. Walking in hospital room? [] 1 [] 2 [x] 3 [] 4   6. Climbing 3-5 steps with a railing?+   [] 1 [] 2 [] 3 [] 4   +If stair climbing cannot be assessed, skip item #6. Sum responses from items 1-5. Current research shows that an AM-PAC score of 17 (13 without stairs) or less is not associated with a discharge to the patient's home setting. Based on an AM-PAC score of 15/24 (15/20 if omitting stairs) and their current functional mobility deficits, it is recommended that the patient have 3-5 sessions per week of Physical Therapy at d/c to increase the patient's independence. r services.
packet 17 g  17 g Oral Daily PRN    acetaminophen (TYLENOL) tablet 650 mg  650 mg Oral Q6H PRN    Or    acetaminophen (TYLENOL) suppository 650 mg  650 mg Rectal Q6H PRN               Lab/Data Reviewed:       Recent Labs     03/26/23  0341 03/27/23  0230   WBC 9.5 9.5   HGB 12.6* 12.8*   HCT 38.8 40.1    223       Recent Labs     03/26/23  0341 03/27/23  0230    137   K 3.6 3.8    105   CO2 27 27   BUN 16 14         Signed By: Jayleen Lassiter MD     March 28, 2023
(13 without stairs) or less is not associated with a discharge to the patient's home setting. Based on an AM-PAC score of /24 (14/20 if omitting stairs) and their current functional mobility deficits, it is recommended that the patient have 3-5 sessions per week of Physical Therapy at d/c to increase the patient's independence.
Value Ref Range    Troponin, High Sensitivity 11 0 - 78 ng/L       Radiology:  XR HIP BILATERAL W AP PELVIS (2 VIEWS)    Result Date: 3/24/2023  EXAM: XR HIP BILATERAL W AP PELVIS (2 VIEWS) INDICATION: R hip pain worse than left status post fall. COMPARISON: None. FINDINGS: 3 views bilateral hips. AP view of the pelvis and frogleg lateral views of both hips demonstrate no fracture, dislocation or other acute abnormality. There are degenerative changes in the hips bilaterally. No acute abnormality    CT Head W/O Contrast    Result Date: 3/24/2023  EXAM: CT HEAD WO CONTRAST INDICATION: head trauma COMPARISON: 2014. CONTRAST: None. TECHNIQUE: Unenhanced CT of the head was performed using 5 mm images. Brain and bone windows were generated. Coronal and sagittal reformats. CT dose reduction was achieved through use of a standardized protocol tailored for this examination and automatic exposure control for dose modulation. FINDINGS: There is slight prominence of the ventricles and sulci minor changes small vessel disease periventricular white matter. No hemorrhage mass or acute infarction is identified. 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 The bone windows demonstrate no abnormalities. The visualized portions of the paranasal sinuses and mastoid air cells are clear. 1. 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 2. No acute intracranial abnormality is identified. CT CERVICAL SPINE WO CONTRAST    Result Date: 3/24/2023  EXAM:  CT CERVICAL SPINE WITHOUT CONTRAST INDICATION: neck trauma. Reason for exam:->neck trauma COMPARISON: None. CONTRAST:  None. TECHNIQUE: Multislice helical CT of the cervical spine was performed without intravenous contrast administration. Sagittal and coronal reformats were generated.   CT dose reduction was achieved through use of a standardized protocol tailored for this